# Patient Record
Sex: MALE | Race: WHITE | HISPANIC OR LATINO | Employment: UNEMPLOYED | URBAN - METROPOLITAN AREA
[De-identification: names, ages, dates, MRNs, and addresses within clinical notes are randomized per-mention and may not be internally consistent; named-entity substitution may affect disease eponyms.]

---

## 2018-08-07 PROBLEM — A41.9 SEVERE SEPSIS: Status: ACTIVE | Noted: 2018-08-07

## 2018-08-07 PROBLEM — N17.9 AKI (ACUTE KIDNEY INJURY): Status: ACTIVE | Noted: 2018-08-07

## 2018-08-07 PROBLEM — R79.89 ELEVATED LACTIC ACID LEVEL: Status: ACTIVE | Noted: 2018-08-07

## 2018-08-07 PROBLEM — R65.20 SEVERE SEPSIS: Status: ACTIVE | Noted: 2018-08-07

## 2018-08-07 PROBLEM — R50.9 FEBRILE ILLNESS: Status: ACTIVE | Noted: 2018-08-07

## 2018-08-07 PROBLEM — E11.9 TYPE 2 DIABETES MELLITUS WITHOUT COMPLICATION, WITHOUT LONG-TERM CURRENT USE OF INSULIN: Status: ACTIVE | Noted: 2018-08-07

## 2018-08-07 PROBLEM — D72.9 CSF PLEOCYTOSIS: Status: ACTIVE | Noted: 2018-08-07

## 2018-08-08 PROBLEM — M21.371 RIGHT FOOT DROP: Status: ACTIVE | Noted: 2018-08-08

## 2018-08-09 PROBLEM — G83.9 PARALYSIS: Status: ACTIVE | Noted: 2018-08-09

## 2018-08-09 PROBLEM — A92.31: Status: ACTIVE | Noted: 2018-08-09

## 2018-08-09 PROBLEM — E11.65 TYPE 2 DIABETES MELLITUS WITH HYPERGLYCEMIA, WITHOUT LONG-TERM CURRENT USE OF INSULIN: Status: ACTIVE | Noted: 2018-08-07

## 2018-08-10 PROBLEM — R79.89 ELEVATED LACTIC ACID LEVEL: Status: RESOLVED | Noted: 2018-08-07 | Resolved: 2018-08-10

## 2018-08-10 PROBLEM — R50.9 FEBRILE ILLNESS: Status: RESOLVED | Noted: 2018-08-07 | Resolved: 2018-08-10

## 2018-08-11 PROBLEM — B00.2 HERPES STOMATITIS: Status: ACTIVE | Noted: 2018-08-11

## 2018-08-12 PROBLEM — N17.9 AKI (ACUTE KIDNEY INJURY): Status: RESOLVED | Noted: 2018-08-07 | Resolved: 2018-08-12

## 2018-08-12 PROBLEM — R07.9 CHEST PAIN: Status: ACTIVE | Noted: 2018-08-12

## 2018-08-13 PROBLEM — N31.2 NEUROGENIC BLADDER, FLACCID: Status: ACTIVE | Noted: 2018-08-13

## 2018-08-14 PROBLEM — R29.810 FACIAL WEAKNESS: Status: ACTIVE | Noted: 2018-08-14

## 2018-08-14 PROBLEM — A41.9 SEVERE SEPSIS: Status: RESOLVED | Noted: 2018-08-07 | Resolved: 2018-08-14

## 2018-08-14 PROBLEM — R65.20 SEVERE SEPSIS: Status: RESOLVED | Noted: 2018-08-07 | Resolved: 2018-08-14

## 2018-08-15 PROBLEM — F32.A DEPRESSION: Status: ACTIVE | Noted: 2018-08-15

## 2018-08-15 PROBLEM — M79.2 NEUROPATHIC PAIN: Status: ACTIVE | Noted: 2018-08-15

## 2018-08-15 PROBLEM — I10 HYPERTENSION: Status: ACTIVE | Noted: 2018-08-15

## 2018-08-15 PROBLEM — G47.00 INSOMNIA: Status: ACTIVE | Noted: 2018-08-15

## 2018-08-19 PROBLEM — E87.6 HYPOKALEMIA: Status: ACTIVE | Noted: 2018-08-19

## 2018-08-21 PROBLEM — A41.9 SEVERE SEPSIS: Status: ACTIVE | Noted: 2018-08-21

## 2018-08-21 PROBLEM — R65.20 SEVERE SEPSIS: Status: ACTIVE | Noted: 2018-08-21

## 2018-08-28 PROBLEM — R11.2 NAUSEA AND VOMITING: Status: ACTIVE | Noted: 2018-08-28

## 2018-08-28 PROBLEM — N39.0 UTI (URINARY TRACT INFECTION): Status: ACTIVE | Noted: 2018-08-28

## 2018-09-09 PROBLEM — N30.00 ACUTE CYSTITIS: Status: ACTIVE | Noted: 2018-09-09

## 2018-09-19 PROBLEM — R33.9 URINE RETENTION: Status: ACTIVE | Noted: 2018-09-19

## 2018-09-30 PROBLEM — R30.0 DYSURIA: Status: ACTIVE | Noted: 2018-09-30

## 2018-09-30 PROBLEM — A41.9 SEPSIS: Status: ACTIVE | Noted: 2018-09-30

## 2018-09-30 PROBLEM — R00.0 TACHYCARDIA: Status: ACTIVE | Noted: 2018-09-30

## 2018-09-30 PROBLEM — R50.9 FEVER, UNKNOWN ORIGIN: Status: ACTIVE | Noted: 2018-09-30

## 2018-09-30 PROBLEM — I95.9 HYPOTENSION: Status: ACTIVE | Noted: 2018-09-30

## 2018-10-01 PROBLEM — A41.9 SEPSIS: Status: RESOLVED | Noted: 2018-09-30 | Resolved: 2018-10-01

## 2018-10-01 PROBLEM — R00.0 TACHYCARDIA: Status: RESOLVED | Noted: 2018-09-30 | Resolved: 2018-10-01

## 2018-10-01 PROBLEM — R30.0 DYSURIA: Status: RESOLVED | Noted: 2018-09-30 | Resolved: 2018-10-01

## 2018-10-01 PROBLEM — I95.9 HYPOTENSION: Status: RESOLVED | Noted: 2018-09-30 | Resolved: 2018-10-01

## 2018-10-01 PROBLEM — N41.0 PROSTATITIS, ACUTE: Status: ACTIVE | Noted: 2018-10-01

## 2018-10-01 PROBLEM — N30.00 ACUTE CYSTITIS WITHOUT HEMATURIA: Status: ACTIVE | Noted: 2018-09-30

## 2020-04-02 ENCOUNTER — NURSE TRIAGE (OUTPATIENT)
Dept: ADMINISTRATIVE | Facility: CLINIC | Age: 50
End: 2020-04-02

## 2020-04-03 NOTE — TELEPHONE ENCOUNTER
"Wife calling, with patient present. Patient reports chest discomfort that is intermittent and becoming worse in frequency and severity. He also is + cough, myalgia, sob, bleeding (stool) and HA. Per protocol he was advised to go be evaluated at the nearest ED. His wife reports she will bring him and verbalizes understanding.     Reason for Disposition   [1] Intermittent  chest pain or "angina" AND [2] increasing in severity or frequency  (Exception: pains lasting a few seconds)    Additional Information   Negative: Severe difficulty breathing (e.g., struggling for each breath, speaks in single words)   Negative: Difficult to awaken or acting confused (e.g., disoriented, slurred speech)   Negative: Shock suspected (e.g., cold/pale/clammy skin, too weak to stand, low BP, rapid pulse)   Negative: [1] Chest pain lasts > 5 minutes AND [2] history of heart disease  (i.e., heart attack, bypass surgery, angina, angioplasty, CHF; not just a heart murmur)   Negative: [1] Chest pain lasts > 5 minutes AND [2] described as crushing, pressure-like, or heavy   Negative: [1] Chest pain lasts > 5 minutes AND [2] age > 50   Negative: [1] Chest pain lasts > 5 minutes AND [2] age > 30 AND [3] at least one cardiac risk factor (i.e., hypertension, diabetes, obesity, smoker or strong family history of heart disease)   Negative: [1] Chest pain lasts > 5 minutes AND [2] not relieved with nitroglycerin   Negative: Passed out (i.e., lost consciousness, collapsed and was not responding)   Negative: Heart beating < 50 beats per minute OR > 140 beats per minute   Negative: Visible sweat on face or sweat dripping down face   Negative: Sounds like a life-threatening emergency to the triager   Negative: SEVERE chest pain    Protocols used: CHEST PAIN-A-AH      "

## 2020-12-09 PROBLEM — R94.39 ABNORMAL STRESS TEST: Status: ACTIVE | Noted: 2020-12-09

## 2021-02-09 ENCOUNTER — TELEPHONE (OUTPATIENT)
Dept: NEUROLOGY | Facility: CLINIC | Age: 51
End: 2021-02-09

## 2021-04-20 ENCOUNTER — OFFICE VISIT (OUTPATIENT)
Dept: NEUROLOGY | Facility: CLINIC | Age: 51
End: 2021-04-20
Payer: MEDICAID

## 2021-04-20 VITALS
BODY MASS INDEX: 35.7 KG/M2 | HEART RATE: 84 BPM | HEIGHT: 66 IN | DIASTOLIC BLOOD PRESSURE: 85 MMHG | WEIGHT: 222.13 LBS | SYSTOLIC BLOOD PRESSURE: 128 MMHG

## 2021-04-20 DIAGNOSIS — M21.372 BILATERAL FOOT-DROP: ICD-10-CM

## 2021-04-20 DIAGNOSIS — E11.65 TYPE 2 DIABETES MELLITUS WITH HYPERGLYCEMIA, WITHOUT LONG-TERM CURRENT USE OF INSULIN: ICD-10-CM

## 2021-04-20 DIAGNOSIS — M21.371 BILATERAL FOOT-DROP: ICD-10-CM

## 2021-04-20 DIAGNOSIS — G63 POLYNEUROPATHY ASSOCIATED WITH UNDERLYING DISEASE: ICD-10-CM

## 2021-04-20 DIAGNOSIS — B20 HIV INFECTION, UNSPECIFIED SYMPTOM STATUS: ICD-10-CM

## 2021-04-20 DIAGNOSIS — R41.89 COGNITIVE AND BEHAVIORAL CHANGES: Primary | ICD-10-CM

## 2021-04-20 DIAGNOSIS — R46.89 COGNITIVE AND BEHAVIORAL CHANGES: Primary | ICD-10-CM

## 2021-04-20 PROCEDURE — 99417 PR PROLONGED SVC, OUTPT, W/WO DIRECT PT CONTACT,  EA ADDTL 15 MIN: ICD-10-PCS | Mod: S$PBB,,, | Performed by: PSYCHIATRY & NEUROLOGY

## 2021-04-20 PROCEDURE — 99999 PR PBB SHADOW E&M-EST. PATIENT-LVL IV: ICD-10-PCS | Mod: PBBFAC,,, | Performed by: PSYCHIATRY & NEUROLOGY

## 2021-04-20 PROCEDURE — 99215 OFFICE O/P EST HI 40 MIN: CPT | Mod: S$PBB,,, | Performed by: PSYCHIATRY & NEUROLOGY

## 2021-04-20 PROCEDURE — 99999 PR PBB SHADOW E&M-EST. PATIENT-LVL IV: CPT | Mod: PBBFAC,,, | Performed by: PSYCHIATRY & NEUROLOGY

## 2021-04-20 PROCEDURE — 99214 OFFICE O/P EST MOD 30 MIN: CPT | Mod: PBBFAC,PO | Performed by: PSYCHIATRY & NEUROLOGY

## 2021-04-20 PROCEDURE — 99215 PR OFFICE/OUTPT VISIT, EST, LEVL V, 40-54 MIN: ICD-10-PCS | Mod: S$PBB,,, | Performed by: PSYCHIATRY & NEUROLOGY

## 2021-04-20 PROCEDURE — 99417 PROLNG OP E/M EACH 15 MIN: CPT | Mod: S$PBB,,, | Performed by: PSYCHIATRY & NEUROLOGY

## 2021-04-20 RX ORDER — LOSARTAN POTASSIUM AND HYDROCHLOROTHIAZIDE 12.5; 5 MG/1; MG/1
TABLET ORAL
COMMUNITY

## 2021-04-20 RX ORDER — GABAPENTIN 400 MG/1
400 CAPSULE ORAL
COMMUNITY
Start: 2021-01-26 | End: 2022-10-11

## 2021-04-20 RX ORDER — METFORMIN HYDROCHLORIDE 1000 MG/1
1000 TABLET ORAL 2 TIMES DAILY
COMMUNITY

## 2021-04-20 RX ORDER — DAPAGLIFLOZIN 10 MG/1
TABLET, FILM COATED ORAL
COMMUNITY
Start: 2021-02-04

## 2021-04-20 RX ORDER — INSULIN GLARGINE 100 [IU]/ML
10 INJECTION, SOLUTION SUBCUTANEOUS NIGHTLY
COMMUNITY

## 2021-07-08 ENCOUNTER — TELEPHONE (OUTPATIENT)
Dept: NEUROLOGY | Facility: CLINIC | Age: 51
End: 2021-07-08

## 2021-08-24 ENCOUNTER — TELEPHONE (OUTPATIENT)
Dept: NEUROLOGY | Facility: CLINIC | Age: 51
End: 2021-08-24

## 2022-10-11 ENCOUNTER — TELEPHONE (OUTPATIENT)
Dept: NEUROLOGY | Facility: CLINIC | Age: 52
End: 2022-10-11
Payer: MEDICAID

## 2022-10-11 ENCOUNTER — OFFICE VISIT (OUTPATIENT)
Dept: NEUROLOGY | Facility: CLINIC | Age: 52
End: 2022-10-11
Payer: MEDICAID

## 2022-10-11 ENCOUNTER — LAB VISIT (OUTPATIENT)
Dept: LAB | Facility: HOSPITAL | Age: 52
End: 2022-10-11
Attending: PSYCHIATRY & NEUROLOGY
Payer: MEDICAID

## 2022-10-11 VITALS
DIASTOLIC BLOOD PRESSURE: 74 MMHG | WEIGHT: 238.13 LBS | SYSTOLIC BLOOD PRESSURE: 137 MMHG | HEART RATE: 71 BPM | RESPIRATION RATE: 17 BRPM | BODY MASS INDEX: 38.43 KG/M2

## 2022-10-11 DIAGNOSIS — R29.6 FREQUENT FALLS: ICD-10-CM

## 2022-10-11 DIAGNOSIS — B20 HIV INFECTION, UNSPECIFIED SYMPTOM STATUS: ICD-10-CM

## 2022-10-11 DIAGNOSIS — E11.65 TYPE 2 DIABETES MELLITUS WITH HYPERGLYCEMIA, WITHOUT LONG-TERM CURRENT USE OF INSULIN: ICD-10-CM

## 2022-10-11 DIAGNOSIS — G63 POLYNEUROPATHY ASSOCIATED WITH UNDERLYING DISEASE: ICD-10-CM

## 2022-10-11 DIAGNOSIS — M21.371 RIGHT FOOT DROP: ICD-10-CM

## 2022-10-11 DIAGNOSIS — G63 POLYNEUROPATHY ASSOCIATED WITH UNDERLYING DISEASE: Primary | ICD-10-CM

## 2022-10-11 LAB
ALBUMIN SERPL BCP-MCNC: 4 G/DL (ref 3.5–5.2)
ALP SERPL-CCNC: 237 U/L (ref 55–135)
ALT SERPL W/O P-5'-P-CCNC: 46 U/L (ref 10–44)
ANION GAP SERPL CALC-SCNC: 10 MMOL/L (ref 8–16)
AST SERPL-CCNC: 28 U/L (ref 10–40)
BASOPHILS # BLD AUTO: 0.11 K/UL (ref 0–0.2)
BASOPHILS NFR BLD: 1 % (ref 0–1.9)
BILIRUB SERPL-MCNC: 0.8 MG/DL (ref 0.1–1)
BUN SERPL-MCNC: 16 MG/DL (ref 6–20)
CALCIUM SERPL-MCNC: 9.3 MG/DL (ref 8.7–10.5)
CHLORIDE SERPL-SCNC: 109 MMOL/L (ref 95–110)
CO2 SERPL-SCNC: 23 MMOL/L (ref 23–29)
CREAT SERPL-MCNC: 1.3 MG/DL (ref 0.5–1.4)
DIFFERENTIAL METHOD: ABNORMAL
EOSINOPHIL # BLD AUTO: 0.5 K/UL (ref 0–0.5)
EOSINOPHIL NFR BLD: 4.6 % (ref 0–8)
ERYTHROCYTE [DISTWIDTH] IN BLOOD BY AUTOMATED COUNT: 14.8 % (ref 11.5–14.5)
EST. GFR  (NO RACE VARIABLE): >60 ML/MIN/1.73 M^2
FOLATE SERPL-MCNC: 17.1 NG/ML (ref 4–24)
GLUCOSE SERPL-MCNC: 184 MG/DL (ref 70–110)
HCT VFR BLD AUTO: 46.4 % (ref 40–54)
HGB BLD-MCNC: 14.7 G/DL (ref 14–18)
IMM GRANULOCYTES # BLD AUTO: 0.05 K/UL (ref 0–0.04)
IMM GRANULOCYTES NFR BLD AUTO: 0.5 % (ref 0–0.5)
LYMPHOCYTES # BLD AUTO: 4.2 K/UL (ref 1–4.8)
LYMPHOCYTES NFR BLD: 39.7 % (ref 18–48)
MCH RBC QN AUTO: 28.9 PG (ref 27–31)
MCHC RBC AUTO-ENTMCNC: 31.7 G/DL (ref 32–36)
MCV RBC AUTO: 91 FL (ref 82–98)
MONOCYTES # BLD AUTO: 0.7 K/UL (ref 0.3–1)
MONOCYTES NFR BLD: 6.3 % (ref 4–15)
NEUTROPHILS # BLD AUTO: 5.1 K/UL (ref 1.8–7.7)
NEUTROPHILS NFR BLD: 47.9 % (ref 38–73)
NRBC BLD-RTO: 0 /100 WBC
PLATELET # BLD AUTO: 206 K/UL (ref 150–450)
PMV BLD AUTO: 12 FL (ref 9.2–12.9)
POTASSIUM SERPL-SCNC: 4.4 MMOL/L (ref 3.5–5.1)
PROT SERPL-MCNC: 7.6 G/DL (ref 6–8.4)
RBC # BLD AUTO: 5.09 M/UL (ref 4.6–6.2)
SODIUM SERPL-SCNC: 142 MMOL/L (ref 136–145)
VIT B12 SERPL-MCNC: 633 PG/ML (ref 210–950)
WBC # BLD AUTO: 10.63 K/UL (ref 3.9–12.7)

## 2022-10-11 PROCEDURE — 86334 IMMUNOFIX E-PHORESIS SERUM: CPT | Mod: 26,,, | Performed by: PATHOLOGY

## 2022-10-11 PROCEDURE — 80053 COMPREHEN METABOLIC PANEL: CPT | Performed by: PSYCHIATRY & NEUROLOGY

## 2022-10-11 PROCEDURE — 82746 ASSAY OF FOLIC ACID SERUM: CPT | Performed by: PSYCHIATRY & NEUROLOGY

## 2022-10-11 PROCEDURE — 84207 ASSAY OF VITAMIN B-6: CPT | Performed by: PSYCHIATRY & NEUROLOGY

## 2022-10-11 PROCEDURE — 1159F PR MEDICATION LIST DOCUMENTED IN MEDICAL RECORD: ICD-10-PCS | Mod: CPTII,,, | Performed by: PSYCHIATRY & NEUROLOGY

## 2022-10-11 PROCEDURE — 3008F BODY MASS INDEX DOCD: CPT | Mod: CPTII,,, | Performed by: PSYCHIATRY & NEUROLOGY

## 2022-10-11 PROCEDURE — 99215 PR OFFICE/OUTPT VISIT, EST, LEVL V, 40-54 MIN: ICD-10-PCS | Mod: S$PBB,,, | Performed by: PSYCHIATRY & NEUROLOGY

## 2022-10-11 PROCEDURE — 1160F RVW MEDS BY RX/DR IN RCRD: CPT | Mod: CPTII,,, | Performed by: PSYCHIATRY & NEUROLOGY

## 2022-10-11 PROCEDURE — 84425 ASSAY OF VITAMIN B-1: CPT | Performed by: PSYCHIATRY & NEUROLOGY

## 2022-10-11 PROCEDURE — 3078F DIAST BP <80 MM HG: CPT | Mod: CPTII,,, | Performed by: PSYCHIATRY & NEUROLOGY

## 2022-10-11 PROCEDURE — 36415 COLL VENOUS BLD VENIPUNCTURE: CPT | Mod: PO | Performed by: PSYCHIATRY & NEUROLOGY

## 2022-10-11 PROCEDURE — 85025 COMPLETE CBC W/AUTO DIFF WBC: CPT | Performed by: PSYCHIATRY & NEUROLOGY

## 2022-10-11 PROCEDURE — 99215 OFFICE O/P EST HI 40 MIN: CPT | Mod: S$PBB,,, | Performed by: PSYCHIATRY & NEUROLOGY

## 2022-10-11 PROCEDURE — 84165 PROTEIN E-PHORESIS SERUM: CPT | Mod: 26,,, | Performed by: PATHOLOGY

## 2022-10-11 PROCEDURE — 84165 PROTEIN E-PHORESIS SERUM: CPT | Performed by: PSYCHIATRY & NEUROLOGY

## 2022-10-11 PROCEDURE — 99999 PR PBB SHADOW E&M-EST. PATIENT-LVL V: ICD-10-PCS | Mod: PBBFAC,,, | Performed by: PSYCHIATRY & NEUROLOGY

## 2022-10-11 PROCEDURE — 3008F PR BODY MASS INDEX (BMI) DOCUMENTED: ICD-10-PCS | Mod: CPTII,,, | Performed by: PSYCHIATRY & NEUROLOGY

## 2022-10-11 PROCEDURE — 3075F SYST BP GE 130 - 139MM HG: CPT | Mod: CPTII,,, | Performed by: PSYCHIATRY & NEUROLOGY

## 2022-10-11 PROCEDURE — 3075F PR MOST RECENT SYSTOLIC BLOOD PRESS GE 130-139MM HG: ICD-10-PCS | Mod: CPTII,,, | Performed by: PSYCHIATRY & NEUROLOGY

## 2022-10-11 PROCEDURE — 3078F PR MOST RECENT DIASTOLIC BLOOD PRESSURE < 80 MM HG: ICD-10-PCS | Mod: CPTII,,, | Performed by: PSYCHIATRY & NEUROLOGY

## 2022-10-11 PROCEDURE — 86334 IMMUNOFIX E-PHORESIS SERUM: CPT | Performed by: PSYCHIATRY & NEUROLOGY

## 2022-10-11 PROCEDURE — 1160F PR REVIEW ALL MEDS BY PRESCRIBER/CLIN PHARMACIST DOCUMENTED: ICD-10-PCS | Mod: CPTII,,, | Performed by: PSYCHIATRY & NEUROLOGY

## 2022-10-11 PROCEDURE — 4010F PR ACE/ARB THEARPY RXD/TAKEN: ICD-10-PCS | Mod: CPTII,,, | Performed by: PSYCHIATRY & NEUROLOGY

## 2022-10-11 PROCEDURE — 99999 PR PBB SHADOW E&M-EST. PATIENT-LVL V: CPT | Mod: PBBFAC,,, | Performed by: PSYCHIATRY & NEUROLOGY

## 2022-10-11 PROCEDURE — 86255 FLUORESCENT ANTIBODY SCREEN: CPT | Mod: 59 | Performed by: PSYCHIATRY & NEUROLOGY

## 2022-10-11 PROCEDURE — 82607 VITAMIN B-12: CPT | Performed by: PSYCHIATRY & NEUROLOGY

## 2022-10-11 PROCEDURE — 99215 OFFICE O/P EST HI 40 MIN: CPT | Mod: PBBFAC,PO | Performed by: PSYCHIATRY & NEUROLOGY

## 2022-10-11 PROCEDURE — 86334 PATHOLOGIST INTERPRETATION IFE: ICD-10-PCS | Mod: 26,,, | Performed by: PATHOLOGY

## 2022-10-11 PROCEDURE — 4010F ACE/ARB THERAPY RXD/TAKEN: CPT | Mod: CPTII,,, | Performed by: PSYCHIATRY & NEUROLOGY

## 2022-10-11 PROCEDURE — 1159F MED LIST DOCD IN RCRD: CPT | Mod: CPTII,,, | Performed by: PSYCHIATRY & NEUROLOGY

## 2022-10-11 PROCEDURE — 84165 PATHOLOGIST INTERPRETATION SPE: ICD-10-PCS | Mod: 26,,, | Performed by: PATHOLOGY

## 2022-10-11 RX ORDER — INSULIN ASPART 100 [IU]/ML
INJECTION, SOLUTION INTRAVENOUS; SUBCUTANEOUS
COMMUNITY
Start: 2022-09-05

## 2022-10-11 RX ORDER — CARIPRAZINE 1.5 MG/1
1.5 CAPSULE, GELATIN COATED ORAL DAILY
COMMUNITY
Start: 2022-10-05

## 2022-10-11 RX ORDER — PEN NEEDLE, DIABETIC 32GX 5/32"
NEEDLE, DISPOSABLE MISCELLANEOUS
COMMUNITY
Start: 2022-10-08

## 2022-10-11 RX ORDER — VALSARTAN 320 MG/1
320 TABLET ORAL DAILY
COMMUNITY
Start: 2022-06-27

## 2022-10-11 RX ORDER — AMLODIPINE BESYLATE 10 MG/1
10 TABLET ORAL DAILY
COMMUNITY
Start: 2022-09-24

## 2022-10-11 RX ORDER — HYDROXYZINE HYDROCHLORIDE 50 MG/1
50 TABLET, FILM COATED ORAL 3 TIMES DAILY
COMMUNITY
Start: 2022-10-02

## 2022-10-11 RX ORDER — CLOTRIMAZOLE AND BETAMETHASONE DIPROPIONATE 10; .64 MG/G; MG/G
CREAM TOPICAL DAILY
COMMUNITY
Start: 2022-08-25

## 2022-10-11 RX ORDER — DOXYCYCLINE HYCLATE 100 MG
100 TABLET ORAL DAILY
COMMUNITY
Start: 2022-08-25

## 2022-10-11 RX ORDER — DAPAGLIFLOZIN AND METFORMIN HYDROCHLORIDE 5; 1000 MG/1; MG/1
2 TABLET, FILM COATED, EXTENDED RELEASE ORAL EVERY MORNING
COMMUNITY
Start: 2022-09-20

## 2022-10-11 RX ORDER — DULAGLUTIDE 4.5 MG/.5ML
INJECTION, SOLUTION SUBCUTANEOUS
COMMUNITY
Start: 2022-09-15

## 2022-10-11 RX ORDER — DORAVIRINE, LAMIVUDINE, AND TENOFOVIR DISOPROXIL FUMARATE 100; 300; 300 MG/1; MG/1; MG/1
1 TABLET, FILM COATED ORAL DAILY
COMMUNITY
Start: 2022-08-15

## 2022-10-11 RX ORDER — GABAPENTIN 600 MG/1
600 TABLET ORAL 3 TIMES DAILY
COMMUNITY
Start: 2022-07-04

## 2022-10-11 RX ORDER — ROSUVASTATIN CALCIUM 40 MG/1
40 TABLET, COATED ORAL DAILY
COMMUNITY
Start: 2022-10-02

## 2022-10-11 RX ORDER — METOPROLOL TARTRATE 50 MG/1
50 TABLET ORAL 2 TIMES DAILY
COMMUNITY
Start: 2022-09-05

## 2022-10-11 NOTE — PROGRESS NOTES
"NEUROLOGY  Outpatient Follow Up  [facility]  [address], [place] [address]  [phone] (office) / [phone] (fax)    Patient Name:  Satya Jackson  :  1970  MR #:  [id number]  Acct #:  [id number]    Date of Neurology Visit: 10/11/2022  Name of Neurologist: Zenia Bender D.O, ABPN, AOBNP, ABSIRI    Other Physicians:  [facility] (Primary Care Physician); No ref. provider found (Referring)      Chief Complaint: Extremity Weakness      History of Present Illness (HPI):  Satya Jackson is a 52 y.o. male returns for follow up of neuropathy.    Unfortunately, he was lost to follow up for a period of time.  Outside records were not received from ID.    Currently his diabetes is improving.      He has been following at [facility].  He was seen by a neuromuscular specialist there.  They said they went a couple of times and there was really no benefit in those visits.      They states he is getting worse.  His walking is worse.  He is falling a lot.  His wife feels his steps appear uncoordinated and he is dragging his foot.     He has not been to  and has not had a foot brace for the foot drop.  He was borrowing a right AFO while going, but was lost to follow up due to illness.  This was at [place].    His memory has been much better lately.  He used to have trouble with loss of words, but that has been better.      His urinary issues have been better.  He is not having accidents.  He is no longer wearing depends.    His bowel issues have also improved.      He had surgery on his eye.  It is doing better.  He had diabetic retinopathy treated with laser.    He is on new medication for HIV.  This was to avoid medication interactions.  His HIV counts are good - "non-detectable".    His A1c is better.  He is managing his diet.  He is on insulin for management (Lantus and Trulicity).  He is seeing Dr. Tolentino at [facility].  He has felt better since the " "blood sugar has been under better control.    When standing, his wife states he will start to fall forward.  He has no issues with dizziness or hearing loss.      Interval Hx:  Initial HPI 4/20/21  Satya Jackson is a 50 y.o. male referred by [facility] for consultation regarding multifocal motor neuropathy, guillain barre syndrome, memory changes and sleep apnea.  Further investigation into the referral seems this was specifically to assess foot drop and neuropathy.  Patient is here with his wife.  His first language is not english, though he does speak it.    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  Since then he started to decline further.    He states that he had "white matter disease".  They thought maybe he had MS.  He had an extensive workup at an outside clinic.  Due to being unable to identify what was going on he was referred to neuromuscular clinic for another opinion.  Symptoms began in 2018 with back pain.  This then progressed to difficulty walking.  He was admitted to the hospital because of rigors and fevers.  He was septic at one point. During the hospitalization he became worse.  Neurology was consulted.  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  After leaving the hospital he seemed to start to decline.  About 1.5 years later he was doing pretty well walking, but he felt that he was struggling more and more to walk.  He states the strength started to decline.    He was diagnosed with HIV in 2020.  His wife thought maybe he got HIV from PLEX.  They don't know if this has been investigated.  He follows at [facility], Dr. Demi Barrera.  He is on treatment for this.  His wife states he has no reflexes and he can't feel his feet.  He has a little bit of tremors in his hand and arms.  He can also get cramps or spasms in his hands.  It is worse when he tries to  things firmly.  His wife has concerns that his memory is also being " affected.  His wife states that he was driving and forgot where he was going.  She states that he will be spaced out at times.  She states that sometimes he will sit with his mouth open.  He is slow to respond to things.  He is repeating himself or asking questions more than he used to.  His wife states this is a change from what he was before.     He thinks his DM is getting better.  He was seen in the hospital earlier this year for congestive heart failure.  His blood sugar was in the 300s after this admission.  Usually he is in the 200s.  He is now seeing an endocrinologist.  His blood work from the last 2 years has been at [facility] so is not fully available at this time.  He states he cannot make it to the bathroom. He will start dripping before he can get to the bathroom.  He has to wear Depends when he goes out.  He has urinated on himself before.  This has not been assessed by his PCP or a urologist.  He did have a digital prostate exam that was reported to be normal.  He was on flomax but is not currently taking it.  He is also having sexual dysfunction.  He has erectile dysfunction since being in the hospital in 2018.    He started having incontinence of bowel last year. This was diarrhea.  There was some intestinal discomfort. It would often happen while sleeping.  Right now he is doing better with this.    He has difficulty swallowing.  This is rare.  He feels like his throat is dry.  When eating he will cough or choke.  He is not on any reflux medications.  He was sent to gastro-enterology because something was seen in his liver.  He had a colonoscopy.  There is concern he could develop liver cirrhosis.  They have not done an endoscopy on him.  The colonoscopy was otherwise normal.  He is on Gabapentin for pain.  He has burning in his feet, mainly in the ball of the foot.  He has no trouble speaking.   He is having some vision issue, but saw the eye doctor and he has bleeding on retina.  They state the  "eye doctor mentioned diabetic retinopathy. Dr. Grimes.  He has been falling.  He has not had any injuries other than some scrapes.  He has a right AFO.  He is using a cane.   He cannot feel cuts and scrapes on his legs.    He is following with podiatry.  He is in PT right now.  He is working on balance, endurance, and strengthening.  This is at [place].  He was not given OT.  He has had hammer toes since being in the hospital.  He has to manually straighten his toes to put on shoes.  He wears a right AFO.    Moved to the  in  from [place].      Treatment to date:    PLEX in hospital in 2018  Gabapentin for neuropathic pain    Review of Systems:    See HPI    Past Medical, Surgical, Family & Social History:   Reviewed and updated.    Home Medications:     Current Outpatient Medications:     BD ÁLVARO 2ND GEN PEN NEEDLE 32 gauge x " Ndle, , Disp: , Rfl:     carvediloL (COREG) 25 MG tablet, Take 25 mg by mouth 2 (two) times daily with meals., Disp: , Rfl:     clotrimazole-betamethasone 1-0.05% (LOTRISONE) cream, Apply topically once daily., Disp: , Rfl:     dapagliflozin (FARXIGA) 10 mg tablet, 1 tablet, Disp: , Rfl:     DELSTRIGO 100-300-300 mg Tab, Take 1 tablet by mouth once daily., Disp: , Rfl:     doxycycline (VIBRA-TABS) 100 MG tablet, Take 100 mg by mouth once daily., Disp: , Rfl:     DULoxetine (CYMBALTA) 60 MG capsule, Take 60 mg by mouth once daily., Disp: , Rfl:     gabapentin (NEURONTIN) 600 MG tablet, Take 600 mg by mouth 3 (three) times daily., Disp: , Rfl:     hydrOXYzine (ATARAX) 50 MG tablet, Take 50 mg by mouth 3 (three) times daily., Disp: , Rfl:     insulin (LANTUS SOLOSTAR U-100 INSULIN) glargine 100 units/mL (3mL) SubQ pen, Inject 10 Units into the skin every evening., Disp: , Rfl:     metoprolol tartrate (LOPRESSOR) 50 MG tablet, Take 50 mg by mouth 2 (two) times daily., Disp: , Rfl:     NOVOLOG FLEXPEN U-100 INSULIN 100 unit/mL (3 mL) InPn pen, SMARTSI SUB-Q 3 Times Daily, " Disp: , Rfl:     rosuvastatin (CRESTOR) 40 MG Tab, Take 40 mg by mouth once daily., Disp: , Rfl:     traZODone (DESYREL) 100 MG tablet, TAKE 1 TABLET BY MOUTH ONCE DAILY AT BEDTIME AS NEEDED, Disp: , Rfl:     TRULICITY 4.5 mg/0.5 mL pen injector, Inject into the skin., Disp: , Rfl:     valsartan (DIOVAN) 320 MG tablet, Take 320 mg by mouth once daily., Disp: , Rfl:     VRAYLAR 1.5 mg Cap, Take 1.5 mg by mouth once daily., Disp: , Rfl:     XIGDUO XR 5-1,000 mg, Take 2 tablets by mouth every morning., Disp: , Rfl:     amLODIPine (NORVASC) 10 MG tablet, Take 10 mg by mouth once daily., Disp: , Rfl:     aspirin (ECOTRIN) 81 MG EC tablet, Take 1 tablet (81 mg total) by mouth once daily., Disp: 90 tablet, Rfl: 3    darunavir-santos-emtri-tenof ala (SYMTUZA) 615-766-578-10 mg Tab, Take 1 tablet by mouth once daily, Disp: , Rfl:     glipizide-metformin (METAGLIP) 2.5-500 mg per tablet, TAKE 1 TABLET BY MOUTH TWICE DAILY WITH A MEAL FOR 30 DAYS, Disp: , Rfl:     losartan (COZAAR) 100 MG tablet, Take 100 mg by mouth once daily., Disp: , Rfl:     losartan-hydrochlorothiazide 50-12.5 mg (HYZAAR) 50-12.5 mg per tablet, 1 tablet, Disp: , Rfl:     metFORMIN (GLUCOPHAGE) 1000 MG tablet, Take 1,000 mg by mouth 2 (two) times a day., Disp: , Rfl:     pravastatin (PRAVACHOL) 40 MG tablet, Take 1 tablet (40 mg total) by mouth once daily., Disp: 90 tablet, Rfl: 0    tamsulosin (FLOMAX) 0.4 mg Cap, Take 2 capsules (0.8 mg total) by mouth every evening., Disp: 60 capsule, Rfl: 6    Physical Examination:  /73 (BP Location: Right leg, Patient Position: Sitting, BP Method: Large (Automatic))   Pulse 77   Resp 17   Wt 108 kg (238 lb 1.6 oz)   BMI 38.43 kg/m²     Vitals:    10/11/22 0904 10/11/22 1105 10/11/22 1109 10/11/22 1110   BP: 130/73 (!) 142/67 (!) 152/72 137/74   BP Location: Right leg Right arm Right arm Right arm   Patient Position: Sitting Lying Sitting Standing   BP Method: Large (Automatic) Medium (Automatic) Medium  (Automatic) Medium (Automatic)   Pulse: 77 63 68 71   Resp: 17      Weight: 108 kg (238 lb 1.6 oz)            GENERAL:  General appearance: Well, non-toxic appearing.  No apparent distress.  Extremities: hammer toes, high arches.    MENTAL STATUS:  Alertness, attention span & concentration: normal.  Language: normal.  Orientation to self, place & time:  States the year and day of week.  Month stated as March.  Memory, recent & remote: fair.  Fund of knowledge: normal.    SPEECH:  Clear and fluent.  Follows complex commands.    CRANIAL NERVES:  Cranial Nerves II-XII were examined.  II - Visual fields: normal.  III, IV, VI: PERRL, EOMI, No ptosis, No nystagmus.  V - Facial sensation: normal.  VII - Face symmetry & mobility: normal.  VIII - Hearing: normal.  IX, X - Palate: mobile & midline.  XI - Shoulder shrug: normal.  XII - Tongue protrusion: normal.    GROSS MOTOR:  Gait & station: uses cane, some difficulty clearing right foot.  Tone: normal.  Abnormal movements: none.  Finger-nose & Heel-knee-shin: normal.  Rapid alternating movements & drift: normal.    MUSCLE STRENGTH:     Fascics Atrophy RIGHT    LEFT Atrophy Fascics     5 Neck Ext. 5       5 Neck Flex 5       5 Deltoids 5       5 Sh.Ext.Rot. 5       5 Sh.Int.Rot. 5       5 Biceps 5       5 Triceps 5       5 Forearm.Pr. 5       5 Wrist Ext. 5       5 Wrist Flex 5       5 Finger Ext. 5       5 Finger Flex 5       5 FPL 5       5 Inteross. 5                         5 Iliopsoas 5       5 Hip Abduct 5       5 Hip Adduct 5       5 Quads 5       5 Hams 5       3 Dorsiflex 5       5 Plantar Flex 5       4 Ankle Zay 5       4 Ankle Invert 5       2 Toe Ext. 3       5 Toe Flex 5                         REFLEXES:    RIGHT Reflex   LEFT   tr Biceps tr   0 Brachiorad. 0   0 Triceps 0   - Pectoralis -   - Jaw Jerk -   - Viera's -        0 Patellar 0   0 Ankle 0   - Suprapatellar -             Down PLANTAR Down         SENSORY:  Sharp touch: Stocking loss to mid  "shin.  Vibration: slightly decreased in toes.      Diagnostic Data Reviewed:   Extensive review of previous records including hospital records, outside neurology records and request of testing from outside neurology (EMG)    [facility] from [facility]:  Neurology consult with Dr. Aquino 8/9/18:  HPI: This is a pleasant 47-year-old gentleman from [place]. He has a history of diabetes mellitus and recently had a left lower extremity abscess anterior to his tibia that was incised and drained 2 weeks ago.Patient presents to the ER today for fevers, chills, Reiger's, low back pain, bilateral lower extremity pain and tingling. Onset of fevers approximately 2 days ago. Patient states he has midline low back pain that has radiated to both flanks and has come around his abdomen. He also states he has some mild chest pain. He has had temperature up to 102. He denies any diarrhea, but has been vomiting. He states it feels "hot" when he urinates.  In the emergency department, patient had tachycardia, fever 101.3, lactic acid of 6.8, but otherwise an unremarkable workup. Urinalysis was normal, CT revealed abdomen without pelvis was unremarkable. Hospital medicine was consulted for further evaluation.     Poorly controlled diabetic, A1C = 12.5. Hypocalcemia, elevated lactate on admission. CSF analysis reveals elevated protein (252), elevated WBC 83, segmented neutrophils 86%.  Glucose 120, consistent with serum levels. Suggestive of viral or aseptic meningitis, ID following and sent more specific studies for enterovirus, WNV, leptospira.  Patient had recently had several skin lesions found to have MSSA. Just before admission had dizziness, headache, unsteady gait, paresthesias in feet ascending up legs then hands.  Weakness a couple days before admission.  Could not move ankles.  Developed bowel and bladder incontinence.  Some dyspnea.  Reflexes absent on exam.  Bilateral foot drop on exam.  CSF however is not consistent with GBS as " pleocytosis is present.  Possible this could be two conditions at once - GBS following skin infections, with concurrent viral encephalitis - or West Nile virus with myelopathic (anterior horn and dorsal root ganglion) involvement.  Given rapid progression of weakness over past 48 hours, would recommend aggressive treatment strategy with ID and PLEX.       Seen in follow up by Dr. Wilcox.    8/13/18: ambulated down the hallway. Feels strength and sensation improving. In speaking with the nurse, she attempted to clamp the turcios but the patient had no sensation to urinate and was retaining 400-500 cc urine. GQ1B, ACE, and lyme negative.         MRI Lumbar Spine W WO Cont   Order: [id number]   Status:  Final result   Visible to patient:  No (Not Released) Next appt:  None   Details            Reading Physician Reading Date Result Priority   Ney De Leon MD 8/7/2018     Narrative       EXAMINATION:  MRI LUMBAR SPINE W WO CONTRAST 08/06/2018 at 10:59 p.m.    CLINICAL HISTORY:  Low back pain, cancer or infection suspected;  clinical history is provided of fever, lower extremity pain bilaterally.  Diabetes.  No history of recent trauma or surgery is provided    TECHNIQUE:  Multiplanar, multisequence images of the lumbar spine are provided with 19 cc Omniscan contrast.    COMPARISON:  CT abdomen and pelvis which includes the lumbar spine same day report of 1453.  No previous MRI is currently available    FINDINGS:  No gross soft tissue defect or significant susceptibility artifact is appreciated.  Osseous alignment appears maintained with no diffuse acute abnormal marrow or conus signal appreciated with the conus ending at approximately T12-L1.  No extruded  type fragment or abnormal enhancement is appreciated.  No paraspinal fluid collections are appreciated.  No gross spondylolysis or spondylolisthesis is appreciated.  There is slight decreased disc space water signal L4-5 and L5-S1 predominantly.  There are renal  cystic changes present for example left laterally measuring 2.5 x 2.2 cm.    L1-2 demonstrates no significant interval spinal canal or neural foraminal narrowing.    L2-3 demonstrates no significant interval spinal canal or neural foraminal narrowing.    L3-4 demonstrates no significant interval spinal canal or neural foraminal narrowing.    L4-5 demonstrates some dorsal disc bulging and annular fissuring with mild lateral recess, inferior neural foraminal narrowing bilaterally.    L5-S1 demonstrates some broad-based dorsal disc bulging with small central protrusion and annular fissuring.  There is mild to moderate lateral recess, inferior neural foraminal narrowing left slightly more so than right.  There does appear to be a small early disc extrusion appearance left paracentral, lateral recess measuring approximately 4 x 5 mm image 10 sagittal T2 and corresponding image 40 T2 axial.       Impression         1. No mass effect, fluid collection or abnormal enhancement is appreciated.  2. There are some mild chronic appearing degenerative disc changes L4-5 and L5-S1 predominantly.  This contributes to Modic endplate degeneration as well as mild to moderate lateral recess, inferior neural foraminal narrowing bilaterally left slightly more so than right.  There does appear to be a small early disc extrusion type appearance at the left lateral recess level with some slight abutment of the exiting nerve root.  The findings are overall concordant with [place] with extrusion side discordance at this level.      Electronically signed by: Ney De Leon MD  Date: 08/07/2018  Time: 06:53            CT Head Without Contrast   Order: [id number]   Status:  Final result   Visible to patient:  No (Not Released) Next appt:  None   Details            Reading Physician Reading Date Result Priority   Keith Brandon MD 8/9/2018     Narrative       EXAMINATION:  CT HEAD WITHOUT CONTRAST    CLINICAL HISTORY:  Confusion, delirium, altered  level of consciousness.    TECHNIQUE:  Axial CT images were obtained of the brain without intravenous contrast.  Coronal and sagittal reformations were obtained.  Automated exposure control utilized to reduce radiation dose.  Total exam DLP is 506 mGy cm.    COMPARISON:  None    FINDINGS:  Gray-white matter differentiation is within normal limits.   No acute intracranial hemorrhage, extra-axial fluid collection, hydrocephalus, mass effect, midline shift is noted.  No large vessel territory acute ischemia is identified.  Visualized paranasal sinuses are clear.  Visualized mastoid air cells are clear.  No acute displaced calvarial fracture is identified.       Impression         1. No acute intracranial abnormalities identified.      Electronically signed by: Keith Brandon MD  Date: 08/09/2018  Time: 12:30             Last Resulted: 08/09/18 12:30              Records from [facility]:  Patient seen for decline in strength, gait, and memory over the past year.  EEG, MRI brain, memory labs, neuropathy labs, 4 limb EMG were ordered.  +HIV labs noted.    MRI brain with microvascular disease.  EEG reported as normal.  EMG reported as motor-sensory demyelinating and axonal peripheral neuropathy.  Neuropathy labs unremarkable.  Cognitive labs unremarkable. He was referred to speech therapy for cognitive.  He was noted to have chronic, asymptomatic GBS.  He was diagnosed with multifocal motor sensory demyelinating neuropathy and given gabaentin.  HIV management er ID.  He was sent for hypercoag labs and stroke workup.  He was sent for MRI C and T spine to be sure no evidence of demyelination.  MRA brain unremarkable.  Echo with 52% EF.  TCD negative.  CUS no stenosis, hypercoag labs neg. He was put on a trial of Lyrica.  Due to swelling in the lower limbs, duplex were ordered - negative. He was also set up for a home sleep study.    [facility] BUE 9/9/20:  This was reported as axonal and demyelinating neuropathy.  Per my  review I do not see any evidence to support demyelination.Conduction velocities are robust.  No latencies were greater than 130% of normal.  [facility] BLE 9/10/20:  This was reported as a peripheral neuropathy.  In my review, there is active denervation present in the left lower extremity more than the right. These changes at L5/S1 could be related to the findings on his MRI lumbar spine.    Component      Latest Ref Rng & Units 8/8/2022 2/9/2022 8/7/2018   Hemoglobin A1C External      4.7 - 5.6 % 7.7 (H) 10.9 (H) 12.5 (H)   Estimated Avg Glucose      <115 mg/dL 174 (H) 266 (H) 312 (H)       Assessment and Plan:  Satya Jackson is a 50 y.o., right handed man with a history of Guillain-[place] syndrome now presenting with cognitive decline, worsening bilateral footdrop, and incontinence of bowel and bladder.  Etiology is not clear, but given his history of very poorly controlled diabetes with diabetic eye disease, this most consistent with diabetic peripheral neuropathy.  The progression has not been rapid as seen with acute demyelinating neuropathy (GBS) and typically this is a once in a lifetime condition.  He could be showing signs of CIDP however there is no evidence of demyelination on his EMG.     The bilateral lower extremity EMG from [facility] suggests some of this could be from his back as well.  MRI of the lumbar spine does reveal L5-S1 disease particularly on the left where his increased active denervation is seen on EMG.    He was sent to speech therapy for cognitive issues and also had a sleep study ordered.      Peripheral neuropathy can also be seen in HIV, particularly at seroconversion.      Advise urology referral for erectile dysfunction and bladder leakage/incontinence.  He has no signs of autonomic dysfunction clinically otherwise.  Orthostatic vital signs today were negative.    Plan per AVS:  Will get blood work today to look at any other factors that could contribute to neuropathy  and balance issues.    Will get a new EMG of the right leg with some comparison in the left and of the arm.      Will get a new AFO (ankle brace) to support your foot and minimize tripping.    Will return to therapy to work on balance and strengthening.      Important to note, also  has a past medical history of Diabetes mellitus, Guillain Barré syndrome, Hypertension, PONV (postoperative nausea and vomiting), Stroke, West Nile encephalitis, and White matter disease.    Time Spent: I spent a total of 57 minutes on the day of the visit.This includes face to face time and non-face to face time preparing to see the patient (eg, review of tests), Obtaining and/or reviewing separately obtained history, Documenting clinical information in the electronic or other health record, Independently interpreting resultsand communicating results to the patient/family/caregiver, or Care coordination.      Zenia Bender D.O, ABPN, AOBNP, ABEM     This note was created with voice recognition software.  Grammatical, syntax and spelling errors may be inevitable.

## 2022-10-11 NOTE — PATIENT INSTRUCTIONS
Will get blood work today to look at any other factors that could contribute to neuropathy and balance issues.    Will get a new EMG of the right leg with some comparison in the left and of the arm.      Will get a new AFO (ankle brace) to support your foot and minimize tripping.    Will return to therapy to work on balance and strengthening.

## 2022-10-12 LAB
ALBUMIN SERPL ELPH-MCNC: 3.86 G/DL (ref 3.35–5.55)
ALPHA1 GLOB SERPL ELPH-MCNC: 0.27 G/DL (ref 0.17–0.41)
ALPHA2 GLOB SERPL ELPH-MCNC: 1.08 G/DL (ref 0.43–0.99)
B-GLOBULIN SERPL ELPH-MCNC: 0.89 G/DL (ref 0.5–1.1)
GAMMA GLOB SERPL ELPH-MCNC: 1.09 G/DL (ref 0.67–1.58)
INTERPRETATION SERPL IFE-IMP: NORMAL
PATHOLOGIST INTERPRETATION IFE: NORMAL
PATHOLOGIST INTERPRETATION SPE: NORMAL
PROT SERPL-MCNC: 7.2 G/DL (ref 6–8.4)

## 2022-10-14 LAB
ANCA AB TITR SER IF: NORMAL TITER
P-ANCA TITR SER IF: NORMAL TITER

## 2022-10-17 LAB
PYRIDOXAL SERPL-MCNC: 13 UG/L (ref 5–50)
VIT B1 BLD-MCNC: 116 UG/L (ref 38–122)

## 2022-10-24 ENCOUNTER — TELEPHONE (OUTPATIENT)
Dept: NEUROLOGY | Facility: CLINIC | Age: 52
End: 2022-10-24
Payer: MEDICAID

## 2022-10-24 ENCOUNTER — CLINICAL SUPPORT (OUTPATIENT)
Dept: REHABILITATION | Facility: HOSPITAL | Age: 52
End: 2022-10-24
Attending: PSYCHIATRY & NEUROLOGY
Payer: MEDICAID

## 2022-10-24 DIAGNOSIS — R29.6 FREQUENT FALLS: ICD-10-CM

## 2022-10-24 DIAGNOSIS — G63 POLYNEUROPATHY ASSOCIATED WITH UNDERLYING DISEASE: ICD-10-CM

## 2022-10-24 DIAGNOSIS — M21.371 RIGHT FOOT DROP: ICD-10-CM

## 2022-10-24 DIAGNOSIS — M62.81 MUSCLE WEAKNESS: Primary | ICD-10-CM

## 2022-10-24 DIAGNOSIS — R26.81 UNSTEADY GAIT: ICD-10-CM

## 2022-10-24 DIAGNOSIS — M25.60 DECREASED RANGE OF MOTION: ICD-10-CM

## 2022-10-24 PROCEDURE — 97110 THERAPEUTIC EXERCISES: CPT | Mod: PN | Performed by: PHYSICAL THERAPIST

## 2022-10-24 PROCEDURE — 97162 PT EVAL MOD COMPLEX 30 MIN: CPT | Mod: PN | Performed by: PHYSICAL THERAPIST
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
## 2022-10-24 NOTE — TELEPHONE ENCOUNTER
Returned Hue's call regarding EMG appointment and lab results.   Informed her the lab result will be forwarded to Dr. Bender to review and Dr. Bender's EMG schedule is full for the rest of the year and the January schedule is not out at this time.  Will give her a call back to schedule.

## 2022-10-31 NOTE — PROGRESS NOTES
[facility]   Physical Therapy Treatment Note     Name: Satya Jackson  Clinic Number: [id number]    Therapy Diagnosis:   Encounter Diagnoses   Name Primary?    Muscle weakness Yes    Decreased range of motion     Unsteady gait      Physician: Zenia Bender,*    Visit Date: 11/1/2022    Physician Orders: Peripheral neuropathy with right foot drop.  Patient with falls and poor postural stability (will start to fall forward when standing).  Please eval and treat.  Medical Diagnosis from Referral:   G63 (ICD-10-CM) - Polyneuropathy associated with underlying disease   M21.371 (ICD-10-CM) - Right foot drop   R29.6 (ICD-10-CM) - Frequent falls      Evaluation Date: 10/24/2022  Authorization Period Expiration: 10-11-23  Plan of Care Expiration: 12-19-22  Progress Note Due: 11-24-22  Visit # / Visits authorized: 2/ 21  FOTO: Issued Visit #: 1     MD Follow up appointment: none scheduled     Precautions: hx of Guillian [place], R foot drop, hx of stroke and CHF, peripheral neuropathy, diabetes      PTA Visit #: 0/5     Time In: 1:47  Time Out: 2:30  Total Billable Time: 43 minutes    SUBJECTIVE     Pt reports: doing ok No falls.  Pt was compliant with home exercise program.  Response to previous treatment: felt ok  Functional change: no falls     Pt reports same foot pain, no change, understands focus is on balance and gait     OBJECTIVE     Objective Measures updated at progress report unless specified.     Treatment     Satya received the treatments listed below:       Satya received therapeutic exercises to develop strength, endurance, ROM, flexibility, and core stabilization for 15 minutes including:  Bridge x 15  SLRx 15  Hip abd SL x 15  GSS with strap x 10 long sitting on mat  Toe crunches x 3 min  Toe abd/add x 20   Marbles x 3:00 about 1/2 bucket alternated between feet     Instructed pt to increase reps with strengthening as giovanna.      neuromuscular re-education activities  to improve: Balance, Coordination, Kinesthetic, Sense, and Proprioception for 28 minutes. The following activities were included:  Bello Balance with ankle AFO R in shoes    Bello Balance score 38/56, see scores in media section    Performed in // bars  March forward/backward x 2 laps  Walk sideways x 2 laps  Balance in standing with eyes closed x 30 sec severe LOB at 20 sec, required assistance from PT   Balance in standing with feet together x 30 sec  Balance in standing with feet together with eyes closed x 30 sec    HOLD Below today  Balance on foam x 30 sec  Balance on foam with eyes closed x 30 sec  Balance on foam with feet together x 30 sec  Balance on foam with feet together with eyes closed x 30 sec  Balance on foam tandem x 30 sec B positions     Patient Education and Home Exercises     Home Exercises Provided and Patient Education Provided     Education provided:   - HEP   - exercise in pain free zone       Written Home Exercises Provided: Patient instructed to cont prior HEP. Exercises were reviewed and Satya was able to demonstrate them prior to the end of the session.  Satya demonstrated good  understanding of the education provided. See EMR under Patient Instructions for exercises provided during therapy sessions    ASSESSMENT     Pt giovanna treatment well, required min cueing for proper performance of therex and able to progress with repetitions.  Pt with mod risk for fall as per Bello Balance score.  Pt with significant LOB with eyes closed possibly due to neuropathy.  Pt will benefit from progression with balance activities to work toward decreased risk for fall.      Satya Is progressing well towards his goals.   Pt prognosis is Fair.     Pt will continue to benefit from skilled outpatient physical therapy to address the goals listed in the initial evaluation, provide pt/family education and to maximize pt's level of independence in the home and community environment.     Pt's spiritual, cultural and  educational needs considered and pt agreeable to plan of care and goals.     Anticipated barriers to physical therapy: slight language barrier possible and will provide  as needed    GOALS:   Short Term Goals:  4 weeks  Increase range of motion 25%  Increase strength 1/2 muscle grade  Be able to perform HEP with minimal cueing required  Improve Bello Balance to 41/56     Long Term Goals: 8 weeks  Increase range of motion to 75% to 100% full   Improve muscle strength 1 muscle grade  Improve Bello Balance to 45/56   Restore ability to ambulate with normal gait with AFO   Walking for ADL will be restored without episodes of falls  Restore ability to stand for ADL without episodes of falls  Restore ability to walk in yard without episodes of falls  Restore ability to perform ADL's and household activities independently and without episodes of falls    PLAN   Continue with established plan of care towards PT goals.      Progress with strength and balance work as tolerated     Lizbeth Castro, PT

## 2022-11-01 ENCOUNTER — CLINICAL SUPPORT (OUTPATIENT)
Dept: REHABILITATION | Facility: HOSPITAL | Age: 52
End: 2022-11-01
Payer: MEDICAID

## 2022-11-01 DIAGNOSIS — R26.81 UNSTEADY GAIT: ICD-10-CM

## 2022-11-01 DIAGNOSIS — M62.81 MUSCLE WEAKNESS: Primary | ICD-10-CM

## 2022-11-01 DIAGNOSIS — M25.60 DECREASED RANGE OF MOTION: ICD-10-CM

## 2022-11-01 PROCEDURE — 97110 THERAPEUTIC EXERCISES: CPT | Mod: PN | Performed by: PHYSICAL THERAPIST

## 2022-11-01 PROCEDURE — 97112 NEUROMUSCULAR REEDUCATION: CPT | Mod: PN | Performed by: PHYSICAL THERAPIST
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
## 2022-11-07 ENCOUNTER — CLINICAL SUPPORT (OUTPATIENT)
Dept: REHABILITATION | Facility: HOSPITAL | Age: 52
End: 2022-11-07
Attending: PSYCHIATRY & NEUROLOGY
Payer: MEDICAID

## 2022-11-07 DIAGNOSIS — R26.81 UNSTEADY GAIT: ICD-10-CM

## 2022-11-07 DIAGNOSIS — M25.60 DECREASED RANGE OF MOTION: ICD-10-CM

## 2022-11-07 DIAGNOSIS — M62.81 MUSCLE WEAKNESS: Primary | ICD-10-CM

## 2022-11-07 PROCEDURE — 97112 NEUROMUSCULAR REEDUCATION: CPT | Mod: PN | Performed by: PHYSICAL THERAPIST

## 2022-11-07 PROCEDURE — 97110 THERAPEUTIC EXERCISES: CPT | Mod: PN | Performed by: PHYSICAL THERAPIST

## 2022-11-17 ENCOUNTER — CLINICAL SUPPORT (OUTPATIENT)
Dept: REHABILITATION | Facility: HOSPITAL | Age: 52
End: 2022-11-17
Attending: PSYCHIATRY & NEUROLOGY
Payer: MEDICAID

## 2022-11-17 DIAGNOSIS — R26.81 UNSTEADY GAIT: ICD-10-CM

## 2022-11-17 DIAGNOSIS — M25.60 DECREASED RANGE OF MOTION: ICD-10-CM

## 2022-11-17 DIAGNOSIS — M62.81 MUSCLE WEAKNESS: Primary | ICD-10-CM

## 2022-11-17 PROCEDURE — 97110 THERAPEUTIC EXERCISES: CPT | Mod: PN,CQ

## 2022-11-17 NOTE — PROGRESS NOTES
[facility]   Physical Therapy Treatment Note     Name: Satya Jackson  Clinic Number: [id number]    Therapy Diagnosis:   Encounter Diagnoses   Name Primary?    Muscle weakness Yes    Decreased range of motion     Unsteady gait        Physician: Zenia Bender,*    Visit Date: 11/17/2022    Physician Orders: Peripheral neuropathy with right foot drop.  Patient with falls and poor postural stability (will start to fall forward when standing).  Please eval and treat.  Medical Diagnosis from Referral:   G63 (ICD-10-CM) - Polyneuropathy associated with underlying disease   M21.371 (ICD-10-CM) - Right foot drop   R29.6 (ICD-10-CM) - Frequent falls      Evaluation Date: 10/24/2022  Authorization Period Expiration: 10-11-23  Plan of Care Expiration: 12-19-22  Progress Note Due: 11-24-22  Visit # / Visits authorized: 4/ 21  FOTO: Issued Visit #: 1     MD Follow up appointment: none scheduled     Precautions: hx of Guillian [place], R foot drop, hx of stroke and CHF, peripheral neuropathy, diabetes      PTA Visit #: 1/5     Time In: 1334  Time Out: 1430  Total Billable Time: 56 minutes    SUBJECTIVE     Pt reports: fell when walking down decline of deck (wearing sandals and not AFO) and twisted his ankle. Fell on his back. Right ankle discomfort 3/10.     Pt was compliant with home exercise program.  Response to previous treatment: felt ok  Functional change: fell Saturday.     Pt reports same foot pain, no change, understands focus is on balance and gait     OBJECTIVE     Objective Measures updated at progress report unless specified.     Treatment     Satya received the treatments listed below:       Satya received therapeutic exercises to develop strength, endurance, ROM, flexibility, and core stabilization for 15 minutes including:  Performed in clinic as warm up Instructed pt to continue working on increasing reps as tolerated at home  Bridge x 15-slight cramp left calf when  "initiating but resolved within seconds  SLRx 10-cues for eccentric control  Hip abd SL x 10-manual A for maintaining stacked hips  March with abdominal bracing, supine x 10     Minisquats x 15  Toe touch on 6" step x 10 each leg  Forward step up 6"  On L 2 episodes of knee locking into extension without good control.  Provided support with PT behind knee to make sure he did not hyperextend knee.  Pt with good control on R but monitored closely with PT providing support at post knee to make sure and avoid possibility of hyperextension -NP  TKE with RTB x 10 standing-NP time  Sit to stand with foam in chair x 10 fatigue at end -NP time  Hip ext leaning on counter(4" step with pillow on mat in clinic)-NP time    Held below due to time constraints  GSS with strap x 10 long sitting on mat-seated in chair today  Toe crunches x 3 min  Toe abd/add x 20  Marbles x 3:00 about 1/2 bucket alternated between feet     Instructed pt to increase reps with strengthening as giovanna.      neuromuscular re-education activities to improve: Balance, Coordination, Kinesthetic, Sense, and Proprioception for 41 minutes. The following activities were included:    Performed in // bars  March forward/backward x 2 laps  Walk sideways x 2 laps  Balance in standing with eyes closed 2 x 30 sec  LOB x 3 no assistance from PT required  Balance in standing with feet together 2 x 30 sec  Balance in standing with feet together with eyes closed 2 x 30 sec  LOB required min assist from PT x 3  Balance on foam 2 x 30 sec with mod instability noted, but able to maintain balance  Balance on foam with eyes closed 2 x 30 sec LOB with PT assistance required   Balance on foam with feet together 2 x 30 sec  Modified rhomberg 2 x 30 sec each foot 1 LOB with R in front       HOLD below  Balance on foam with feet together with eyes closed x 30 sec  Balance on foam tandem x 30 sec B positions     Patient Education and Home Exercises     Home Exercises Provided and " Patient Education Provided     Education provided:   - HEP   - exercise in pain free zone       Written Home Exercises Provided: Patient instructed to cont prior HEP.     Exercises were reviewed and Satya was able to demonstrate them prior to the end of the session.  Satya demonstrated good  understanding of the education provided. See EMR under Patient Instructions for exercises provided during therapy sessions    ASSESSMENT     Mild lightheadedness with initial sitting after supine exercises which resolved within a few seconds.  Limited standing exercises today to prevent increase in right ankle discomfort. Pain remained 3/10 throughout session.  Extra time for each marble with pickups 2* difficulty grasping, cues for increased toe grounding before attempting to lift. Minimal A for LOB with balance challenges with eyes closed. Cueing to engage toes to help with stability in standing balance challenges. Pt will benefit from continued work on balance to improve stability     Satya Is progressing well towards his goals.   Pt prognosis is Fair.     Pt will continue to benefit from skilled outpatient physical therapy to address the goals listed in the initial evaluation, provide pt/family education and to maximize pt's level of independence in the home and community environment.     Pt's spiritual, cultural and educational needs considered and pt agreeable to plan of care and goals.     Anticipated barriers to physical therapy: slight language barrier possible and will provide  as needed    GOALS:   Short Term Goals:  4 weeks   ongoing  Increase range of motion 25%  Increase strength 1/2 muscle grade  Be able to perform HEP with minimal cueing required  Improve Bello Balance to 41/56     Long Term Goals: 8 weeks   ongoing  Increase range of motion to 75% to 100% full   Improve muscle strength 1 muscle grade  Improve Bello Balance to 45/56   Restore ability to ambulate with normal gait with AFO   Walking for  ADL will be restored without episodes of falls  Restore ability to stand for ADL without episodes of falls  Restore ability to walk in yard without episodes of falls  Restore ability to perform ADL's and household activities independently and without episodes of falls    PLAN   Continue with established plan of care towards PT goals.      Progress with strength and balance work as tolerated     Selin Oneil, SHERRY

## 2022-11-22 ENCOUNTER — TELEPHONE (OUTPATIENT)
Dept: NEUROLOGY | Facility: CLINIC | Age: 52
End: 2022-11-22
Payer: MEDICAID

## 2022-11-25 NOTE — PROGRESS NOTES
[facility]   Physical Therapy Progress and Treatment Note     Name: Satya Jackson  Clinic Number: [id number]    Therapy Diagnosis:   Encounter Diagnoses   Name Primary?    Muscle weakness Yes    Decreased range of motion     Unsteady gait        Physician: Zenia Bender,*    Visit Date: 11/28/2022    Physician Orders: Peripheral neuropathy with right foot drop.  Patient with falls and poor postural stability (will start to fall forward when standing).  Please eval and treat.  Medical Diagnosis from Referral:   G63 (ICD-10-CM) - Polyneuropathy associated with underlying disease   M21.371 (ICD-10-CM) - Right foot drop   R29.6 (ICD-10-CM) - Frequent falls      Evaluation Date: 10/24/2022  Authorization Period Expiration: 10-11-23  Plan of Care Expiration: 12-19-22  Progress Note Due: 12-19-22  Visit # / Visits authorized: 5/ 21  FOTO: Issued Visit #: 1     MD Follow up appointment: none scheduled     Precautions: hx of Guillian [place], R foot drop, hx of stroke and CHF, peripheral neuropathy, diabetes      PTA Visit #: 1/5     Time In: 12:45  Time Out: 1:30  Total Billable Time: 45 minutes    SUBJECTIVE     Pt reports: overall feeling stronger and more stable with walking     Pt was compliant with home exercise program.  Response to previous treatment: felt ok  Functional change: walking improved and wearing AFO    Pt reports same foot pain, no change, understands focus is on balance and gait     OBJECTIVE   Functional assessment:   - walking/gait: slight FB of trunk but normal gait at IE slight FB of trunk with wide based steppage gait   - sit to stand: min use of hands at IE mod use of hands  - sit to supine: no difficulty       - supine to sit: no difficulty  - supine to prone: no difficulty at IE min difficulty     Ankle ROM: (measured in degrees) 11-28-22  Ankle RLE LLE   Dorsiflexion with knees straight 0/0 0/0   Dorsiflexion with knees bent -5/-5 5/5   Plantarflexion  45/45 45/45   Inversion 30/30 30/30   Eversion 20/20 20/20   Great toe flexion 30/30 30/35   Great toe extension 0/35 35/45             Ankle ROM: (measured in degrees) initial eval  Ankle  Active /Passive RLE LLE   Dorsiflexion with knees straight -5/-5 0/0   Dorsiflexion with knees bent -5/-5 5/5   Plantarflexion 45/45 45/45   Inversion 30/30 30/30   Eversion 15/15 20/20   Great toe flexion 30/30 30/35   Great toe extension  0/35 35/45      Knee  ROM: WNL     Flexibility testing:  - hamstrings:     90/90 test R 20 L 15 at IE R 20 L 20         Muscle Strength 11-28-22  MMT R L   Hip flexion 4+/5 4+/5   Hip abduction 4+/5 4+/5   Hip extension 4+/5 4+/5   Glut max 3+/5 3/5   Toe flexors     Toe extensor EHL  0/5 3-/5   Knee extension 5/5 5/5   Knee flexion 5/5 5/5   Ankle dorsiflexion 3/5 5/5   Ankle plantar flexion 4/5 4/5   Ankle inversion + PF  4-/5 5/5   Ankle eversion 4/5 5/5                 Muscle Strength initial eval   MMT R L   Hip flexion 4-/5 4-/5   Hip abduction 4/5 4-/5   Hip extension 4+/5 4+/5   Glut max 3-/5 3-/5   Toe flexors 3+/5 3/5   Toe extensors EHL 0/5 3-/5   Knee extension 5/5 5/5   Knee flexion 4/5 4+/5   Ankle dorsiflexion 3/5 5/5   Ankle plantar flexion 4-/5 4/5   Ankle inversion + PF  3+/5 3+/5   Ankle eversion 4/5 4+/5      Endurance is fair     Special tests: Bello Balance score 41/56 at IE 38/56     Palpation: no c/o TTP in foot and ankle      Joint mobility: not tested      Sensation: pt able to feel light touch and pressure and toe positioning for proprioception         CMS Impairment/Limitation/Restriction for FOTO balance Survey     Therapist reviewed FOTO scores for Satya Jackson   FOTO documents entered into [employer] - see Media section.     Limitation Score: 57 % at IE 56%          Treatment     Satya received the treatments listed below:       Satya received therapeutic exercises to develop strength, endurance, ROM, flexibility, and core stabilization for 30 minutes  "including:  Performed in clinic as warm up Instructed pt to continue working on increasing reps as tolerated at home     Soleus stretch with strap x 10   Sit to stand x 10 pillow in chair    Verbal review of ex below  Above provided to add to HEP  Bridge x 15-slight cramp left calf when initiating but resolved within seconds  SLRx 10-cues for eccentric control  Hip abd SL x 10-manual A for maintaining stacked hips  March with abdominal bracing, supine x 10     Minisquats x 15  Toe touch on 6" step x 10 each leg  Forward step up 6"  On L 2 episodes of knee locking into extension without good control.  Provided support with PT behind knee to make sure he did not hyperextend knee.  Pt with good control on R but monitored closely with PT providing support at post knee to make sure and avoid possibility of hyperextension -NP  TKE with RTB x 10 standing-NP time  Sit to stand with foam in chair x 10 fatigue at end -NP time  Hip ext leaning on counter(4" step with pillow on mat in clinic)-NP time      GSS with strap x 10 long sitting on mat-seated in chair today  Toe crunches x 3 min  Toe abd/add x 20  Marbles x 3:00 about 1/2 bucket alternated between feet     Instructed pt to increase reps with strengthening as giovanna.      neuromuscular re-education activities to improve: Balance, Coordination, Kinesthetic, Sense, and Proprioception for 15 minutes. The following activities were included:    Bello Balance test  Held below due to time constraints    Performed in // bars  March forward/backward x 2 laps  Walk sideways x 2 laps  Balance in standing with eyes closed 2 x 30 sec  LOB x 3 no assistance from PT required  Balance in standing with feet together 2 x 30 sec  Balance in standing with feet together with eyes closed 2 x 30 sec  LOB required min assist from PT x 3  Balance on foam 2 x 30 sec with mod instability noted, but able to maintain balance  Balance on foam with eyes closed 2 x 30 sec LOB with PT assistance required "   Balance on foam with feet together 2 x 30 sec  Modified rhomberg 2 x 30 sec each foot 1 LOB with R in front       HOLD below  Balance on foam with feet together with eyes closed x 30 sec  Balance on foam tandem x 30 sec B positions     Patient Education and Home Exercises     Home Exercises Provided and Patient Education Provided     Education provided:   - HEP   - exercise in pain free zone       Written Home Exercises Provided: yes    Exercises were reviewed and Satya was able to demonstrate them prior to the end of the session.  Satya demonstrated good  understanding of the education provided. See EMR under Patient Instructions for exercises provided during therapy sessions    ASSESSMENT   Pt with no ankle c/o. Patient demonstrates improvement in range of motion, strength, stabilization and function.  Pt with similar FOTO, but slightly improved Bello balance.  Pt continues with lack of EHL and appears to understand need to always use AFO to avoid tripping resulting in fall.          Satya Is progressing well towards his goals.   Pt prognosis is Fair.     Pt will continue to benefit from skilled outpatient physical therapy to address the goals listed in the initial evaluation, provide pt/family education and to maximize pt's level of independence in the home and community environment.     Pt's spiritual, cultural and educational needs considered and pt agreeable to plan of care and goals.     Anticipated barriers to physical therapy: slight language barrier possible and will provide  as needed    GOALS:   Short Term Goals:  4 weeks   MET STG's  Increase range of motion 25%  Increase strength 1/2 muscle grade  Be able to perform HEP with minimal cueing required  Improve Bello Balance to 41/56     Long Term Goals: 8 weeks   ongoing  Increase range of motion to 75% to 100% full   Improve muscle strength 1 muscle grade  Improve Bello Balance to 45/56   Restore ability to ambulate with normal gait with AFO    Walking for ADL will be restored without episodes of falls  Restore ability to stand for ADL without episodes of falls  Restore ability to walk in yard without episodes of falls  Restore ability to perform ADL's and household activities independently and without episodes of falls    PLAN   Continue with established plan of care towards PT goals.      Progress with strength and balance work as tolerated     Lizbeth Castro, PT

## 2022-11-28 ENCOUNTER — CLINICAL SUPPORT (OUTPATIENT)
Dept: REHABILITATION | Facility: HOSPITAL | Age: 52
End: 2022-11-28
Attending: PSYCHIATRY & NEUROLOGY
Payer: MEDICAID

## 2022-11-28 DIAGNOSIS — R26.81 UNSTEADY GAIT: ICD-10-CM

## 2022-11-28 DIAGNOSIS — M25.60 DECREASED RANGE OF MOTION: ICD-10-CM

## 2022-11-28 DIAGNOSIS — M62.81 MUSCLE WEAKNESS: Primary | ICD-10-CM

## 2022-11-28 PROCEDURE — 97110 THERAPEUTIC EXERCISES: CPT | Mod: PN | Performed by: PHYSICAL THERAPIST

## 2022-11-28 PROCEDURE — 97112 NEUROMUSCULAR REEDUCATION: CPT | Mod: PN | Performed by: PHYSICAL THERAPIST

## 2022-11-28 NOTE — PLAN OF CARE
[facility]   Physical Therapy Progress and Treatment Note     Name: Satya Jackson  Clinic Number: [id number]    Therapy Diagnosis:   Encounter Diagnoses   Name Primary?    Muscle weakness Yes    Decreased range of motion     Unsteady gait        Physician: Zenia Bender,*    Visit Date: 11/28/2022    Physician Orders: Peripheral neuropathy with right foot drop.  Patient with falls and poor postural stability (will start to fall forward when standing).  Please eval and treat.  Medical Diagnosis from Referral:   G63 (ICD-10-CM) - Polyneuropathy associated with underlying disease   M21.371 (ICD-10-CM) - Right foot drop   R29.6 (ICD-10-CM) - Frequent falls      Evaluation Date: 10/24/2022  Authorization Period Expiration: 10-11-23  Plan of Care Expiration: 12-19-22  Progress Note Due: 12-19-22  Visit # / Visits authorized: 5/ 21  FOTO: Issued Visit #: 1     MD Follow up appointment: none scheduled     Precautions: hx of Guillian [place], R foot drop, hx of stroke and CHF, peripheral neuropathy, diabetes      PTA Visit #: 1/5     Time In: 12:45  Time Out: 1:30  Total Billable Time: 45 minutes    SUBJECTIVE     Pt reports: overall feeling stronger and more stable with walking     Pt was compliant with home exercise program.  Response to previous treatment: felt ok  Functional change: walking improved and wearing AFO    Pt reports same foot pain, no change, understands focus is on balance and gait     OBJECTIVE   Functional assessment:   - walking/gait: slight FB of trunk but normal gait at IE slight FB of trunk with wide based steppage gait   - sit to stand: min use of hands at IE mod use of hands  - sit to supine: no difficulty       - supine to sit: no difficulty  - supine to prone: no difficulty at IE min difficulty     Ankle ROM: (measured in degrees) 11-28-22  Ankle RLE LLE   Dorsiflexion with knees straight 0/0 0/0   Dorsiflexion with knees bent -5/-5 5/5   Plantarflexion  45/45 45/45   Inversion 30/30 30/30   Eversion 20/20 20/20   Great toe flexion 30/30 30/35   Great toe extension 0/35 35/45             Ankle ROM: (measured in degrees) initial eval  Ankle  Active /Passive RLE LLE   Dorsiflexion with knees straight -5/-5 0/0   Dorsiflexion with knees bent -5/-5 5/5   Plantarflexion 45/45 45/45   Inversion 30/30 30/30   Eversion 15/15 20/20   Great toe flexion 30/30 30/35   Great toe extension  0/35 35/45      Knee  ROM: WNL     Flexibility testing:  - hamstrings:     90/90 test R 20 L 15 at IE R 20 L 20         Muscle Strength 11-28-22  MMT R L   Hip flexion 4+/5 4+/5   Hip abduction 4+/5 4+/5   Hip extension 4+/5 4+/5   Glut max 3+/5 3/5   Toe flexors     Toe extensor EHL  0/5 3-/5   Knee extension 5/5 5/5   Knee flexion 5/5 5/5   Ankle dorsiflexion 3/5 5/5   Ankle plantar flexion 4/5 4/5   Ankle inversion + PF  4-/5 5/5   Ankle eversion 4/5 5/5                 Muscle Strength initial eval   MMT R L   Hip flexion 4-/5 4-/5   Hip abduction 4/5 4-/5   Hip extension 4+/5 4+/5   Glut max 3-/5 3-/5   Toe flexors 3+/5 3/5   Toe extensors EHL 0/5 3-/5   Knee extension 5/5 5/5   Knee flexion 4/5 4+/5   Ankle dorsiflexion 3/5 5/5   Ankle plantar flexion 4-/5 4/5   Ankle inversion + PF  3+/5 3+/5   Ankle eversion 4/5 4+/5      Endurance is fair     Special tests: Bello Balance score 41/56 at IE 38/56     Palpation: no c/o TTP in foot and ankle      Joint mobility: not tested      Sensation: pt able to feel light touch and pressure and toe positioning for proprioception         CMS Impairment/Limitation/Restriction for FOTO balance Survey     Therapist reviewed FOTO scores for Satya Jackson   FOTO documents entered into [email or web address] - see Media section.     Limitation Score: 57 % at IE 56%          Treatment     Satya received the treatments listed below:       Satya received therapeutic exercises to develop strength, endurance, ROM, flexibility, and core stabilization for 30 minutes  "including:  Performed in clinic as warm up Instructed pt to continue working on increasing reps as tolerated at home     Soleus stretch with strap x 10   Sit to stand x 10 pillow in chair    Verbal review of ex below  Above provided to add to HEP  Bridge x 15-slight cramp left calf when initiating but resolved within seconds  SLRx 10-cues for eccentric control  Hip abd SL x 10-manual A for maintaining stacked hips  March with abdominal bracing, supine x 10     Minisquats x 15  Toe touch on 6" step x 10 each leg  Forward step up 6"  On L 2 episodes of knee locking into extension without good control.  Provided support with PT behind knee to make sure he did not hyperextend knee.  Pt with good control on R but monitored closely with PT providing support at post knee to make sure and avoid possibility of hyperextension -NP  TKE with RTB x 10 standing-NP time  Sit to stand with foam in chair x 10 fatigue at end -NP time  Hip ext leaning on counter(4" step with pillow on mat in clinic)-NP time      GSS with strap x 10 long sitting on mat-seated in chair today  Toe crunches x 3 min  Toe abd/add x 20  Marbles x 3:00 about 1/2 bucket alternated between feet     Instructed pt to increase reps with strengthening as giovanna.      neuromuscular re-education activities to improve: Balance, Coordination, Kinesthetic, Sense, and Proprioception for 15 minutes. The following activities were included:    Bello Balance test  Held below due to time constraints    Performed in // bars  March forward/backward x 2 laps  Walk sideways x 2 laps  Balance in standing with eyes closed 2 x 30 sec  LOB x 3 no assistance from PT required  Balance in standing with feet together 2 x 30 sec  Balance in standing with feet together with eyes closed 2 x 30 sec  LOB required min assist from PT x 3  Balance on foam 2 x 30 sec with mod instability noted, but able to maintain balance  Balance on foam with eyes closed 2 x 30 sec LOB with PT assistance required "   Balance on foam with feet together 2 x 30 sec  Modified rhomberg 2 x 30 sec each foot 1 LOB with R in front       HOLD below  Balance on foam with feet together with eyes closed x 30 sec  Balance on foam tandem x 30 sec B positions     Patient Education and Home Exercises     Home Exercises Provided and Patient Education Provided     Education provided:   - HEP   - exercise in pain free zone       Written Home Exercises Provided: yes    Exercises were reviewed and Satya was able to demonstrate them prior to the end of the session.  Satya demonstrated good  understanding of the education provided. See EMR under Patient Instructions for exercises provided during therapy sessions    ASSESSMENT   Pt with no ankle c/o. Patient demonstrates improvement in range of motion, strength, stabilization and function.  Pt with similar FOTO, but slightly improved Bello balance.  Pt continues with lack of EHL and appears to understand need to always use AFO to avoid tripping resulting in fall.          Satya Is progressing well towards his goals.   Pt prognosis is Fair.     Pt will continue to benefit from skilled outpatient physical therapy to address the goals listed in the initial evaluation, provide pt/family education and to maximize pt's level of independence in the home and community environment.     Pt's spiritual, cultural and educational needs considered and pt agreeable to plan of care and goals.     Anticipated barriers to physical therapy: slight language barrier possible and will provide  as needed    GOALS:   Short Term Goals:  4 weeks   MET STG's  Increase range of motion 25%  Increase strength 1/2 muscle grade  Be able to perform HEP with minimal cueing required  Improve Bello Balance to 41/56     Long Term Goals: 8 weeks   ongoing  Increase range of motion to 75% to 100% full   Improve muscle strength 1 muscle grade  Improve Bello Balance to 45/56   Restore ability to ambulate with normal gait with AFO    Walking for ADL will be restored without episodes of falls  Restore ability to stand for ADL without episodes of falls  Restore ability to walk in yard without episodes of falls  Restore ability to perform ADL's and household activities independently and without episodes of falls    PLAN   Continue with established plan of care towards PT goals.      Progress with strength and balance work as tolerated     Lizbeth Castro, PT

## 2022-12-07 ENCOUNTER — CLINICAL SUPPORT (OUTPATIENT)
Dept: REHABILITATION | Facility: HOSPITAL | Age: 52
End: 2022-12-07
Payer: MEDICAID

## 2022-12-07 DIAGNOSIS — R26.81 UNSTEADY GAIT: ICD-10-CM

## 2022-12-07 DIAGNOSIS — M25.60 DECREASED RANGE OF MOTION: ICD-10-CM

## 2022-12-07 DIAGNOSIS — M62.81 MUSCLE WEAKNESS: Primary | ICD-10-CM

## 2022-12-07 PROCEDURE — 97110 THERAPEUTIC EXERCISES: CPT | Mod: PN,CQ

## 2022-12-07 NOTE — PROGRESS NOTES
[facility]   Physical Therapy Progress and Treatment Note     Name: Satya Jackson  Clinic Number: [id number]    Therapy Diagnosis:   Encounter Diagnoses   Name Primary?    Muscle weakness Yes    Decreased range of motion     Unsteady gait          Physician: Zenia Bender,*    Visit Date: 12/7/2022    Physician Orders: Peripheral neuropathy with right foot drop.  Patient with falls and poor postural stability (will start to fall forward when standing).  Please eval and treat.  Medical Diagnosis from Referral:   G63 (ICD-10-CM) - Polyneuropathy associated with underlying disease   M21.371 (ICD-10-CM) - Right foot drop   R29.6 (ICD-10-CM) - Frequent falls      Evaluation Date: 10/24/2022  Authorization Period Expiration: 112/19/2022  Plan of Care Expiration: 12-19-22  Progress Note Due: 12-19-22  Visit # / Visits authorized: 6/ 21  FOTO: Issued Visit #: 1     MD Follow up appointment: none scheduled     Precautions: hx of Guillian [place], R foot drop, hx of stroke and CHF, peripheral neuropathy, diabetes      PTA Visit #: 1/5     Time In: 1405  Time Out: 1500  Total Billable Time: 55 minutes    SUBJECTIVE     Pt reports: no pain today. Denies soreness after last session. Wearing right AFO.     Pt was compliant with home exercise program.  Response to previous treatment: felt ok  Functional change: walking improved and wearing AFO    Pt reports (none) same foot pain, no change, understands focus is on balance and gait     OBJECTIVE   Functional assessment:   - walking/gait: slight FB of trunk but normal gait at IE slight FB of trunk with wide based steppage gait   - sit to stand: min use of hands at IE mod use of hands  - sit to supine: no difficulty       - supine to sit: no difficulty  - supine to prone: no difficulty at IE min difficulty     Ankle ROM: (measured in degrees) 11-28-22  Ankle RLE LLE   Dorsiflexion with knees straight 0/0 0/0   Dorsiflexion with knees bent  -5/-5 5/5   Plantarflexion 45/45 45/45   Inversion 30/30 30/30   Eversion 20/20 20/20   Great toe flexion 30/30 30/35   Great toe extension 0/35 35/45             Ankle ROM: (measured in degrees) initial eval  Ankle  Active /Passive RLE LLE   Dorsiflexion with knees straight -5/-5 0/0   Dorsiflexion with knees bent -5/-5 5/5   Plantarflexion 45/45 45/45   Inversion 30/30 30/30   Eversion 15/15 20/20   Great toe flexion 30/30 30/35   Great toe extension  0/35 35/45      Knee  ROM: WNL     Flexibility testing:  - hamstrings:     90/90 test R 20 L 15 at IE R 20 L 20         Muscle Strength 11-28-22  MMT R L   Hip flexion 4+/5 4+/5   Hip abduction 4+/5 4+/5   Hip extension 4+/5 4+/5   Glut max 3+/5 3/5   Toe flexors     Toe extensor EHL  0/5 3-/5   Knee extension 5/5 5/5   Knee flexion 5/5 5/5   Ankle dorsiflexion 3/5 5/5   Ankle plantar flexion 4/5 4/5   Ankle inversion + PF  4-/5 5/5   Ankle eversion 4/5 5/5                 Muscle Strength initial eval   MMT R L   Hip flexion 4-/5 4-/5   Hip abduction 4/5 4-/5   Hip extension 4+/5 4+/5   Glut max 3-/5 3-/5   Toe flexors 3+/5 3/5   Toe extensors EHL 0/5 3-/5   Knee extension 5/5 5/5   Knee flexion 4/5 4+/5   Ankle dorsiflexion 3/5 5/5   Ankle plantar flexion 4-/5 4/5   Ankle inversion + PF  3+/5 3+/5   Ankle eversion 4/5 4+/5      Endurance is fair     Special tests: Bello Balance score 41/56 at IE 38/56     Palpation: no c/o TTP in foot and ankle      Joint mobility: not tested      Sensation: pt able to feel light touch and pressure and toe positioning for proprioception         CMS Impairment/Limitation/Restriction for FOTO balance Survey     Therapist reviewed FOTO scores for Satya Jackson   FOTO documents entered into [email or web address] - see Media section.     Limitation Score: 57 % at IE 56%          Treatment     Satya received the treatments listed below:       Satya received therapeutic exercises to develop strength, endurance, ROM, flexibility, and core  "stabilization for 30 minutes including:  Performed in clinic as warm up Instructed pt to continue working on increasing reps as tolerated at home    Soleus stretch with strap x 10  Sit to stand x 10 pillow in chair    Bridge x 20-no cramp today  SLRx 15 cues for eccentric control  Hip abd SL x 10-manual A for maintaining stacked hips  March with abdominal bracing, supine x 15     Minisquats x 15  Toe touch on 6" step x 10 each leg  Forward step up 6"  x 10 On L 2 episodes of knee locking into extension without good control.  Provided support with PT behind knee to make sure he did not hyperextend knee.  Pt with good control on R but monitored closely with PT providing support at post knee to make sure and avoid possibility of hyperextension   TKE with RTB x 10 standing-NP time  Sit to stand with foam in chair x 10 fatigue at end   Hip ext leaning on counter(4" step with pillow on mat in clinic)-NP time      GSS with strap x 10 long sitting on mat-seated in chair today  Toe crunches x 3 min  Toe abd/add x 20  Marbles x 3:00 about 1/2 bucket alternated between feet     Instructed pt to increase reps with strengthening as giovanna.      neuromuscular re-education activities to improve: Balance, Coordination, Kinesthetic, Sense, and Proprioception for 25 minutes. The following activities were included:    Performed in // bars:  March forward/backward x 2 laps  Walk sideways x 2 laps  Balance in standing with eyes closed 2 x 30 sec  LOB x 3 no assistance from PT required  Balance in standing with feet together 2 x 30 sec  Balance in standing with feet together with eyes closed 2 x 30 sec  LOB required min assist from PT x 3  Balance on foam 2 x 30 sec with mod instability noted, but able to maintain balance  Balance on foam with eyes closed 2 x 30 sec LOB with PT assistance required   Balance on foam with feet together 2 x 30 sec  Modified rhomberg 2 x 30 sec each foot 1 LOB with R in front   Modified SLS 3 x 10s each "    Tandem walking forward x 3   Backward walking x 3   Toe taps to cones x 10 each-bilateral two finger support    HOLD below  Balance on foam with feet together with eyes closed x 30 sec  Balance on foam tandem x 30 sec B positions     Patient Education and Home Exercises     Home Exercises Provided and Patient Education Provided     Education provided:   - HEP   - exercise in pain free zone       Written Home Exercises Provided:  Instructed patient to continue current home exercise program.    Exercises were reviewed and Satya was able to demonstrate them prior to the end of the session.  Satya demonstrated good  understanding of the education provided. See EMR under Patient Instructions for exercises provided during therapy sessions    ASSESSMENT     Cues for eccentric control with supine exercises with good correction. Progression of strengthening exercises with increased reps. Manual cues for stacking hips in side lying hip abduction. Tactile cues for increased grounding of left>right toes with scrunches. Posterior LOB with static stand with eyes closed with and without foam, and relies heavily on parallel bars for recovery and occasional A per therapist. Able to progress with standing balance training with verbal cues to prevent locking out left knee in stance with good correction without manual cues. No episodes of locking out left knee with step ups today.      Satya Is progressing well towards his goals.   Pt prognosis is Fair.     Pt will continue to benefit from skilled outpatient physical therapy to address the goals listed in the initial evaluation, provide pt/family education and to maximize pt's level of independence in the home and community environment.     Pt's spiritual, cultural and educational needs considered and pt agreeable to plan of care and goals.     Anticipated barriers to physical therapy: slight language barrier possible and will provide  as needed    GOALS:   Short Term  Goals:  4 weeks   MET STG's  Increase range of motion 25%  Increase strength 1/2 muscle grade  Be able to perform HEP with minimal cueing required  Improve Bello Balance to 41/56     Long Term Goals: 8 weeks   ongoing  Increase range of motion to 75% to 100% full   Improve muscle strength 1 muscle grade  Improve Bello Balance to 45/56   Restore ability to ambulate with normal gait with AFO   Walking for ADL will be restored without episodes of falls  Restore ability to stand for ADL without episodes of falls  Restore ability to walk in yard without episodes of falls  Restore ability to perform ADL's and household activities independently and without episodes of falls    PLAN   Continue with established plan of care towards PT goals.      Progress with strength and balance work as tolerated     Selin Oneil, PTA

## 2022-12-09 NOTE — PROGRESS NOTES
[facility]   Physical Therapy Reassessment/Plan of Care and Treatment Note     Name: Satya Jackson  Clinic Number: [id number]    Therapy Diagnosis:   Encounter Diagnoses   Name Primary?    Muscle weakness Yes    Decreased range of motion     Unsteady gait          Physician: Zenia Bender,*    Visit Date: 12/12/2022    Physician Orders: Peripheral neuropathy with right foot drop.  Patient with falls and poor postural stability (will start to fall forward when standing).  Please eval and treat.  Medical Diagnosis from Referral:   G63 (ICD-10-CM) - Polyneuropathy associated with underlying disease   M21.371 (ICD-10-CM) - Right foot drop   R29.6 (ICD-10-CM) - Frequent falls      Evaluation Date: 10/24/2022  Authorization Period Expiration: 12/19/2022  Plan of Care Expiration: 1-9-23  Progress Note Due: 1-9-23  Visit # / Visits authorized: 8/ 21  FOTO: Issued Visit #: 1     MD Follow up appointment: none scheduled     Precautions: hx of Guillian [place], R foot drop, hx of stroke and CHF, peripheral neuropathy, diabetes    PTA Visit #: 1/5     Time In: 1:38   Time Out: 2:20  Total Billable Time: 42 minutes    SUBJECTIVE     Pt reports: he is feeling pretty good, feeling more stable     Pt was compliant with home exercise program.  Response to previous treatment: felt ok  Functional change: walking improved and wearing AFO    Pt reports (none) same foot pain, no change, understands focus is on balance and gait     OBJECTIVE   Functional assessment:   - walking/gait: slight FB of trunk but normal gait at IE slight FB of trunk with wide based steppage gait   - sit to stand: min use of hands, but able to perform with no hands at IE mod use of hands  - sit to supine: no difficulty       - supine to sit: no difficulty  - supine to prone: no difficulty at IE min difficulty     Ankle ROM: (measured in degrees) same as 11-28-22  Ankle RLE LLE   Dorsiflexion with knees straight 0/0 0/0    Dorsiflexion with knees bent -5/-5 5/5   Plantarflexion 45/45 45/45   Inversion 30/30 30/30   Eversion 20/20 20/20   Great toe flexion 30/30 30/35   Great toe extension 0/35 35/45             Ankle ROM: (measured in degrees) initial eval  Ankle  Active /Passive RLE LLE   Dorsiflexion with knees straight -5/-5 0/0   Dorsiflexion with knees bent -5/-5 5/5   Plantarflexion 45/45 45/45   Inversion 30/30 30/30   Eversion 15/15 20/20   Great toe flexion 30/30 30/35   Great toe extension  0/35 35/45      Knee  ROM: WNL     Flexibility testing:  - hamstrings:     90/90 test same as 11-28-22R 20 L 15 at IE R 20 L 20         Muscle Strength 12-12-22  MMT R L   Hip flexion 5-/5 4+/5   Hip abduction 4+/5 5-/5   Hip extension 4+/5 4+/5   Glut max 4-/5 3+/5   Toe flexors     Toe extensor EHL  0/5 3-/5   Knee extension 5/5 5/5   Knee flexion 5/5 5/5   Ankle dorsiflexion 3/5 5/5   Ankle plantar flexion 4/5 4/5   Ankle inversion + PF  4-/5 5/5   Ankle eversion 4/5 5/5                 Muscle Strength initial eval   MMT R L   Hip flexion 4-/5 4-/5   Hip abduction 4/5 4-/5   Hip extension 4+/5 4+/5   Glut max 3-/5 3-/5   Toe flexors 3+/5 3/5   Toe extensors EHL 0/5 3-/5   Knee extension 5/5 5/5   Knee flexion 4/5 4+/5   Ankle dorsiflexion 3/5 5/5   Ankle plantar flexion 4-/5 4/5   Ankle inversion + PF  3+/5 3+/5   Ankle eversion 4/5 4+/5      Endurance is fair     Special tests: Bello Balance score 43/56 at IE 38/56     Palpation: no c/o TTP in foot and ankle      Joint mobility: not tested      Sensation: pt able to feel light touch and pressure and toe positioning for proprioception         CMS Impairment/Limitation/Restriction for FOTO balance Survey     Therapist reviewed FOTO scores for Satya Jackson   FOTO documents entered into [employer] - see Media section.     Limitation Score: 57 % on 11-28-22at IE 56%          Treatment     Satya received the treatments listed below:       Satya received therapeutic exercises to  "develop strength, endurance, ROM, flexibility, and core stabilization for 27 minutes including:  Performed in clinic as warm up Instructed pt to continue working on increasing reps as tolerated at home    Attempted GTB with ankle motion and unable to perform in any plane but PF so held    Pt reports he has not been doing soleus stretch just sit to stand.  Pt reports he understands to do both ex now.  Reprinted soleus stretch as per pt request   Soleus stretch with strap x 10  Sit to stand x 10 pillow in chair      GSS with strap x 10 long sitting on mat-seated in chair today  Soleus stretch with strap x 10  Toe crunches x 3 min reminder to straighten toes back out between reps   Toe abd/add x 20   Active ankle IN/EV sitting x 10   Active DF sitting x 10  Heel raises with resistance from self x 10  Marbles x 3:00 about 1/2 bucket alternated between feet     Instructed pt to increase reps with strengthening as giovanna.      Not performed due to time  Bridge x 20-no cramp today  SLRx 15 cues for eccentric control  Hip abd SL x 10-manual A for maintaining stacked hips  March with abdominal bracing, supine x 15     Minisquats x 15  Toe touch on 6" step x 10 each leg  Forward step up 6"  x 10 On L 2 episodes of knee locking into extension without good control.  Provided support with PT behind knee to make sure he did not hyperextend knee.  Pt with good control on R but monitored closely with PT providing support at post knee to make sure and avoid possibility of hyperextension   TKE with RTB x 10 standing-NP time  Sit to stand with foam in chair x 10 fatigue at end   Hip ext leaning on counter(4" step with pillow on mat in clinic)-NP time  neuromuscular re-education activities to improve: Balance, Coordination, Kinesthetic, Sense, and Proprioception for 15 minutes. The following activities were included:    Performed in // bars:    Bello balance test  Not performed below due to time   March forward/backward x 2 laps  Walk " sideways x 2 laps  Balance in standing with eyes closed 2 x 30 sec  LOB x 3 no assistance from PT required  Balance in standing with feet together 2 x 30 sec  Balance in standing with feet together with eyes closed 2 x 30 sec  LOB required min assist from PT x 3  Balance on foam 2 x 30 sec with mod instability noted, but able to maintain balance  Balance on foam with eyes closed 2 x 30 sec LOB with PT assistance required   Balance on foam with feet together 2 x 30 sec  Modified rhomberg 2 x 30 sec each foot 1 LOB with R in front   Modified SLS 3 x 10s each    Tandem walking forward x 3   Backward walking x 3   Toe taps to cones x 10 each-bilateral two finger support    HOLD below  Balance on foam with feet together with eyes closed x 30 sec  Balance on foam tandem x 30 sec B positions     Patient Education and Home Exercises     Home Exercises Provided and Patient Education Provided     Education provided:   - HEP   - exercise in pain free zone       Written Home Exercises Provided:  Instructed patient to continue current home exercise program.    Exercises were reviewed and Satya was able to demonstrate them prior to the end of the session.  Satya demonstrated good  understanding of the education provided. See EMR under Patient Instructions for exercises provided during therapy sessions    ASSESSMENT   Pt has not been able to attend PT on a regular basis due to developing car trouble resulting in lack of transportation and then appt times not working due to  issues.  Pt currently feels upcoming appts should enable him to be more compliant with PT. Pt does display some improvement in strength and in Bello Balance test and will benefit from continued treatment to assist pt in fully achieving his goals for another 4 weeks      Satya Is progressing well towards his goals.   Pt prognosis is Fair.     Pt will continue to benefit from skilled outpatient physical therapy to address the goals listed in the  initial evaluation, provide pt/family education and to maximize pt's level of independence in the home and community environment.     Pt's spiritual, cultural and educational needs considered and pt agreeable to plan of care and goals.     Anticipated barriers to physical therapy: slight language barrier possible and will provide  as needed    GOALS:   Short Term Goals:  4 weeks   MET STG's  Increase range of motion 25%  Increase strength 1/2 muscle grade  Be able to perform HEP with minimal cueing required  Improve Bello Balance to 41/56     Long Term Goals: 8 weeks   ongoing  Increase range of motion to 75% to 100% full   Improve muscle strength 1 muscle grade  Improve Bello Balance to 45/56   Restore ability to ambulate with normal gait with AFO   Walking for ADL will be restored without episodes of falls  Restore ability to stand for ADL without episodes of falls  Restore ability to walk in yard without episodes of falls  Restore ability to perform ADL's and household activities independently and without episodes of falls    PLAN       Plan of care Certification: 12-12/2022 to 1-9-23     Outpatient Physical Therapy 2 times weekly for 4 weeks to include the following interventions:  Therapeutic exercises, manual therapy  neuromuscular re-education , therapeutic activities, therapeutic taping, modalities will be considered and utilized as needed; temporary orthotics will be considered and utilized as needed     Lizbeth Castro, PT      I certify the need for these services furnished under this plan of treatment and while under my care.     ____________________________________ Physician/Referring Practitioner                                       Date of Signature

## 2022-12-12 ENCOUNTER — CLINICAL SUPPORT (OUTPATIENT)
Dept: REHABILITATION | Facility: HOSPITAL | Age: 52
End: 2022-12-12
Payer: MEDICAID

## 2022-12-12 DIAGNOSIS — M25.60 DECREASED RANGE OF MOTION: ICD-10-CM

## 2022-12-12 DIAGNOSIS — R26.81 UNSTEADY GAIT: ICD-10-CM

## 2022-12-12 DIAGNOSIS — M62.81 MUSCLE WEAKNESS: Primary | ICD-10-CM

## 2022-12-12 PROCEDURE — 97112 NEUROMUSCULAR REEDUCATION: CPT | Mod: PN | Performed by: PHYSICAL THERAPIST

## 2022-12-12 PROCEDURE — 97110 THERAPEUTIC EXERCISES: CPT | Mod: PN | Performed by: PHYSICAL THERAPIST

## 2022-12-12 NOTE — PLAN OF CARE
[facility] [place] [facility]   Physical Therapy Reassessment/Plan of Care      Name: Satya Jackson  Clinic Number: [id number]    Therapy Diagnosis:   Encounter Diagnoses   Name Primary?    Muscle weakness Yes    Decreased range of motion     Unsteady gait          Physician: Zenia Bender,*    Visit Date: 12/12/2022    Physician Orders: Peripheral neuropathy with right foot drop.  Patient with falls and poor postural stability (will start to fall forward when standing).  Please eval and treat.  Medical Diagnosis from Referral:   G63 (ICD-10-CM) - Polyneuropathy associated with underlying disease   M21.371 (ICD-10-CM) - Right foot drop   R29.6 (ICD-10-CM) - Frequent falls      Evaluation Date: 10/24/2022  Authorization Period Expiration: 12/19/2022  Plan of Care Expiration: 1-9-23  Progress Note Due: 1-9-23  Visit # / Visits authorized: 8/ 21  FOTO: Issued Visit #: 1     MD Follow up appointment: none scheduled     Precautions: hx of Guillian Granville, R foot drop, hx of stroke and CHF, peripheral neuropathy, diabetes    PTA Visit #: 1/5     Time In: 1:38   Time Out: 2:20  Total Billable Time: 42 minutes    SUBJECTIVE     Pt reports: he is feeling pretty good, feeling more stable     Pt was compliant with home exercise program.  Response to previous treatment: felt ok  Functional change: walking improved and wearing AFO    Pt reports (none) same foot pain, no change, understands focus is on balance and gait     OBJECTIVE   Functional assessment:   - walking/gait: slight FB of trunk but normal gait at IE slight FB of trunk with wide based steppage gait   - sit to stand: min use of hands, but able to perform with no hands at IE mod use of hands  - sit to supine: no difficulty       - supine to sit: no difficulty  - supine to prone: no difficulty at IE min difficulty     Ankle ROM: (measured in degrees) same as 11-28-22  Ankle RLE LLE   Dorsiflexion with knees straight 0/0 0/0   Dorsiflexion with  knees bent -5/-5 5/5   Plantarflexion 45/45 45/45   Inversion 30/30 30/30   Eversion 20/20 20/20   Great toe flexion 30/30 30/35   Great toe extension 0/35 35/45             Ankle ROM: (measured in degrees) initial eval  Ankle  Active /Passive RLE LLE   Dorsiflexion with knees straight -5/-5 0/0   Dorsiflexion with knees bent -5/-5 5/5   Plantarflexion 45/45 45/45   Inversion 30/30 30/30   Eversion 15/15 20/20   Great toe flexion 30/30 30/35   Great toe extension  0/35 35/45      Knee  ROM: WNL     Flexibility testing:  - hamstrings:     90/90 test same as 11-28-22R 20 L 15 at IE R 20 L 20         Muscle Strength 12-12-22  MMT R L   Hip flexion 5-/5 4+/5   Hip abduction 4+/5 5-/5   Hip extension 4+/5 4+/5   Glut max 4-/5 3+/5   Toe flexors     Toe extensor EHL  0/5 3-/5   Knee extension 5/5 5/5   Knee flexion 5/5 5/5   Ankle dorsiflexion 3/5 5/5   Ankle plantar flexion 4/5 4/5   Ankle inversion + PF  4-/5 5/5   Ankle eversion 4/5 5/5                 Muscle Strength initial eval   MMT R L   Hip flexion 4-/5 4-/5   Hip abduction 4/5 4-/5   Hip extension 4+/5 4+/5   Glut max 3-/5 3-/5   Toe flexors 3+/5 3/5   Toe extensors EHL 0/5 3-/5   Knee extension 5/5 5/5   Knee flexion 4/5 4+/5   Ankle dorsiflexion 3/5 5/5   Ankle plantar flexion 4-/5 4/5   Ankle inversion + PF  3+/5 3+/5   Ankle eversion 4/5 4+/5      Endurance is fair     Special tests: Bello Balance score 43/56 at IE 38/56     Palpation: no c/o TTP in foot and ankle      Joint mobility: not tested      Sensation: pt able to feel light touch and pressure and toe positioning for proprioception         CMS Impairment/Limitation/Restriction for FOTO balance Survey     Therapist reviewed FOTO scores for Satya Jackson   FOTO documents entered into [employer] - see Media section.     Limitation Score: 57 % on 11-28-22at IE 56%      ASSESSMENT     Pt has not been able to attend PT on a regular basis due to developing car trouble resulting in lack of transportation  and then appt times not working due to  issues.  Pt currently feels upcoming appts should enable him to be more compliant with PT. Pt does display some improvement in strength and in Bello Balance test and will benefit from continued treatment to assist pt in fully achieving his goals for another 4 weeks      Satya Is progressing well towards his goals.   Pt prognosis is Fair.     Pt will continue to benefit from skilled outpatient physical therapy to address the goals listed in the initial evaluation, provide pt/family education and to maximize pt's level of independence in the home and community environment.     Pt's spiritual, cultural and educational needs considered and pt agreeable to plan of care and goals.     Anticipated barriers to physical therapy: slight language barrier possible and will provide  as needed    GOALS:   Short Term Goals:  4 weeks   MET STG's  Increase range of motion 25%  Increase strength 1/2 muscle grade  Be able to perform HEP with minimal cueing required  Improve Bello Balance to 41/56     Long Term Goals: 8 weeks   ongoing  Increase range of motion to 75% to 100% full   Improve muscle strength 1 muscle grade  Improve Bello Balance to 45/56   Restore ability to ambulate with normal gait with AFO   Walking for ADL will be restored without episodes of falls  Restore ability to stand for ADL without episodes of falls  Restore ability to walk in yard without episodes of falls  Restore ability to perform ADL's and household activities independently and without episodes of falls    PLAN       Plan of care Certification: 12-12/2022 to 1-9-23     Outpatient Physical Therapy 2 times weekly for 4 weeks to include the following interventions:  Therapeutic exercises, manual therapy  neuromuscular re-education , therapeutic activities, therapeutic taping, modalities will be considered and utilized as needed; temporary orthotics will be considered and utilized as needed      Lizbeth Castro, PT      I certify the need for these services furnished under this plan of treatment and while under my care.     ____________________________________ Physician/Referring Practitioner                                       Date of Signature

## 2022-12-29 ENCOUNTER — TELEPHONE (OUTPATIENT)
Dept: REHABILITATION | Facility: HOSPITAL | Age: 52
End: 2022-12-29
Payer: MEDICAID

## 2022-12-30 ENCOUNTER — DOCUMENTATION ONLY (OUTPATIENT)
Dept: REHABILITATION | Facility: HOSPITAL | Age: 52
End: 2022-12-30
Payer: MEDICAID

## 2022-12-30 DIAGNOSIS — R26.81 UNSTEADY GAIT: ICD-10-CM

## 2022-12-30 DIAGNOSIS — M25.60 DECREASED RANGE OF MOTION: ICD-10-CM

## 2022-12-30 DIAGNOSIS — M62.81 MUSCLE WEAKNESS: Primary | ICD-10-CM

## 2022-12-30 NOTE — PROGRESS NOTES
PHYSICAL THERAPY DISCHARGE SUMMARY    Name: Satya Jackson  Referring Provider: Zenia Bender,*  PT Order: Peripheral neuropathy with right foot drop.  Patient with falls and poor postural stability (will start to fall forward when standing).  Please eval and treat.  Clinical #: [id number]  Discharge Summary Date: 12/30/2022  Diagnosis:   1. Muscle weakness        2. Decreased range of motion        3. Unsteady gait            Patient was seen for 8 OP PT visits from 10-24-22 to 12-12-22. Pt cancelled/no show visit 11 scheduled sessions. Treatment included: evaluation, HEP, pt education, ther ex, and neuromuscular re-education. PT unable to fully assess goal achievement as pt did not return for follow up sessions/did not reschedule follow up visits. This patient is discharged from OP PT Services.

## 2023-01-30 ENCOUNTER — TELEPHONE (OUTPATIENT)
Dept: NEUROLOGY | Facility: CLINIC | Age: 53
End: 2023-01-30
Payer: MEDICAID

## 2023-01-30 NOTE — TELEPHONE ENCOUNTER
----- Message from Marvin Van sent at 1/30/2023  2:00 PM CST -----  Regarding: reschedule  Contact: YURI MORALES [[id number]]  Type:  Sooner Appointment Request    Caller is requesting a sooner appointment.      Name of Caller:  Hue    When is the first available appointment?  Dept book    Symptoms:  na    Best Call Back Number:  [phone]     Additional Information:  Pt needs to reschedule an EMG from this afternoon. Please call to advise

## 2023-01-31 NOTE — TELEPHONE ENCOUNTER
Lvm informing patient of time and date 4/3/23@1PM for EMG study; placed on hold and send message to management to schedule.

## 2023-03-30 ENCOUNTER — TELEPHONE (OUTPATIENT)
Dept: NEUROLOGY | Facility: CLINIC | Age: 53
End: 2023-03-30
Payer: MEDICAID

## 2023-04-03 ENCOUNTER — TELEPHONE (OUTPATIENT)
Dept: NEUROLOGY | Facility: CLINIC | Age: 53
End: 2023-04-03
Payer: MEDICAID

## 2023-04-03 ENCOUNTER — PROCEDURE VISIT (OUTPATIENT)
Dept: NEUROLOGY | Facility: CLINIC | Age: 53
End: 2023-04-03
Payer: MEDICAID

## 2023-04-03 DIAGNOSIS — M21.371 RIGHT FOOT DROP: ICD-10-CM

## 2023-04-03 DIAGNOSIS — R29.6 FREQUENT FALLS: Primary | ICD-10-CM

## 2023-04-03 DIAGNOSIS — G63 POLYNEUROPATHY ASSOCIATED WITH UNDERLYING DISEASE: ICD-10-CM

## 2023-04-03 PROCEDURE — 95911 NRV CNDJ TEST 9-10 STUDIES: CPT | Mod: 26,S$PBB,, | Performed by: PSYCHIATRY & NEUROLOGY

## 2023-04-03 PROCEDURE — 95886 MUSC TEST DONE W/N TEST COMP: CPT | Mod: PBBFAC,PO | Performed by: PSYCHIATRY & NEUROLOGY

## 2023-04-03 PROCEDURE — 95911 PR NERVE CONDUCTION STUDY; 9-10 STUDIES: ICD-10-PCS | Mod: 26,S$PBB,, | Performed by: PSYCHIATRY & NEUROLOGY

## 2023-04-03 PROCEDURE — 95886 MUSC TEST DONE W/N TEST COMP: CPT | Mod: 26,S$PBB,, | Performed by: PSYCHIATRY & NEUROLOGY

## 2023-04-03 PROCEDURE — 95886 PR EMG COMPLETE, W/ NERVE CONDUCTION STUDIES, 5+ MUSCLES: ICD-10-PCS | Mod: 26,S$PBB,, | Performed by: PSYCHIATRY & NEUROLOGY

## 2023-04-03 PROCEDURE — 95911 NRV CNDJ TEST 9-10 STUDIES: CPT | Mod: PBBFAC,PO | Performed by: PSYCHIATRY & NEUROLOGY

## 2023-04-03 NOTE — TELEPHONE ENCOUNTER
LVM informing patient of the written Ambulatory referral/consult to pysical/occupational therapy from Dr. Bender would be mailed to him.  Patient left before the order was given to him.

## 2023-05-01 NOTE — PROCEDURES
[facility] [place] [facility]  [address] [facility] [address]  [place] [address]  [phone]      Full Name: Satya Jackson Gender: Male  Patient ID: [id number] YOB: 1970      Visit Date: 4/3/2023 1:21 PM  Age: 52 Years  Examining Physician: Zenia Bender D.O., ABPN, AOBNP, ABEM   Referring Physician: Zenia Bender D.O.   Technologist: JULIAN Herrera   Height: 5 feet 6 inch  History: Diabetic patient complaining of difficulty with balance, with burning pain, numbness and tingling of both feet.  He complains of right foot drop. Low back pain on both sides.  Hx GBS 2018, h/o HIV.  Evaluate the right upper and lower extremities.      Sensory NCS      Nerve / Sites Rec. Site Onset Lat Peak Lat NP Amp Segments Distance Velocity Temp.     ms ms µV  cm m/s °C   R Median - Digit II (Antidromic)      Wrist Dig II 3.13 4.08 3.1 Wrist - Dig II 13 42 32.9      Ref.   ?3.60 ?15.0 Ref.  ?50    R Ulnar - Digit V (Antidromic)      Wrist Dig V 2.19 2.75 4.6 Wrist - Dig V 11 50 32.9      Ref.   ?3.10 ?12.0 Ref.  ?50    R Sural - Ankle (Calf)      Calf Ankle NR NR NR Calf - Ankle 14 NR 32.9      Ref.   ?4.60 ?4.0 Ref.  ?40    R Superficial peroneal - Ankle      Lat leg Ankle NR NR NR Lat leg - Ankle 12 NR 32.9      Ref.   ?4.50 ?4.0 Ref.          Motor NCS      Nerve / Sites Muscle Latency Ref. Amplitude Ref. Amp % Duration Segments Distance Lat Diff Ref. Velocity Ref. Temp.     ms ms mV mV % ms  cm ms ms m/s m/s °C   R Median - APB      Wrist APB 3.69 ?4.00 12.7 ?6.0 100 6.42 Wrist - APB      32.9      Elbow APB 8.21  12.2  96.6 6.90 Elbow - Wrist 23.5 4.52  52 ?50 32.9   R Ulnar - ADM      Wrist ADM 2.46 ?3.10 8.5 ?7.0 100 6.04 Wrist - ADM      32.9      B.Elbow ADM 6.23  8.3  98.3 6.29 B.Elbow - Wrist 20 3.77  53 ?50 32.9      A.Elbow ADM 8.50  8.3  98 6.46 A.Elbow - B.Elbow 12 2.27  53  32.9   R Peroneal - EDB      Ankle EDB NR ?6.00 NR ?2.5 NR NR Ankle - EDB      32.9    R Peroneal - Tib Ant      Fib Head Tib Ant 2.54 ?4.50 3.4 ?3.0 100 10.75 Fib Head - Tib Ant      32.9      Pop fossa Tib Ant 5.69  3.2  93.8 11.81 Pop fossa - Fib Head 12 3.15  38 ?40 32.9   R Tibial - AH      Ankle AH 5.04 ?6.00 0.1 ?4.0 100 8.69 Ankle - AH      32.9      Pop fossa AH 20.31  0.1  48.9  Pop fossa - Ankle 39 15.27  26 ?40 32.9       F  Wave      Nerve Fmin Ref.    ms ms   R Median - APB 29.01 ?31.00   R Ulnar - ADM 29.43 ?32.00   R Peroneal - EDB NR ?56.00   R Tibial - AH NR ?56.00       EMG Summary Table     Spontaneous Recruitment Activation Duration Amplitude Polyphasia Comment   Muscle Ins Act Fib Fasc Pattern - - - - -   R. First dorsal interosseous Normal 0 0 Sl Dec Normal N/+1 N/+1 N/+1 Normal   R. Abductor pollicis brevis Normal 0 0 Sl Dec Normal N/+1 +1 N/+1 Normal   R. Pronator teres Normal 0 0 Normal Normal Normal Normal Normal Normal   R. Extensor digitorum communis Normal 0 0 Normal Normal Normal Normal Normal Normal   R. Biceps brachii Normal 0 0 Normal Normal Normal Normal Normal Normal   R. Triceps brachii Normal 0 0 Normal Normal Normal Normal Normal Normal   R. Deltoid Normal 0 0 Normal Normal Normal Normal Normal Normal   R. Tibialis anterior Normal 0 0 Sl Dec Normal +1 +1 +1 Normal   R. Gastrocnemius (Medial head) Normal 0 0 Sl Dec Normal +1 +1 +1 Normal   R. Extensor hallucis longus Normal 0 0 Mod Dec Normal +1 +1 +1 Normal   R. Flexor digitorum longus Normal 0 0 Mod Dec Normal +1 +2 +1 Normal   R. Vastus medialis Normal 0 0 Normal Normal Normal Normal Normal Normal   R. Tensor fasciae latae Normal 0 0 Normal Normal Normal Normal Normal Normal   R. Biceps femoris (short head) Normal 0 0 Normal Normal Normal Normal Normal Normal         Satya Jackson [id number] 4/3/2023 1:21 PM     3 of 4    Summary:  Nerve conduction studies were performed in the right upper and lower extremities.  Right median sensory response was prolonged with a reduced amplitude.  Right ulnar sensory  response was normal in latency with a reduced amplitude.  Right sural and superficial peroneal sensory responses were absent.  Right median and ulnar motor responses were normal in amplitude, latency and velocity.  Right peroneal motor response recording the extensor digitorum brevis muscle was absent.  Right peroneal motor response recording the tibialis anterior was normal in amplitude and latency with a borderline velocity.  Right tibial motor study was markedly reduced in amplitude with normal latency and slowing of the velocity.  Right median and ulnar minimal F wave latencies were normal.  Right peroneal and tibial F waves were absent.  Needle EMG was performed in the right upper and lower extremities.  Motor units were large, long and poly phasic with reduced recruitment in the right 1st dorsal interosseous, abductor pollicis brevis, tibialis anterior, medial gastrocnemius, extensor hallucis longus and flexor digitorum longus muscles.  All other motor unit morphology and recruitment patterns were normal.    Impression:  This is an abnormal EMG of the right upper and lower extremities.  There is electrophysiologic evidence of a chronic, severe, peripheral polyneuropathy that is axonal in nature without active denervation.  There is no evidence of any other focal neuropathy, plexopathy, radiculopathy or myopathy on the study.        Thank you for referring to the [facility] in [place]. Please feel free to contact the clinic if you have any further questions regarding this study or report.       _____________________________  Zenia Bender D.O., ABPN, AOBNP, RUSS Jackson [id number] 4/3/2023 1:21 PM     3 of 4

## 2024-07-03 PROBLEM — S86.911A KNEE STRAIN, RIGHT, INITIAL ENCOUNTER: Status: ACTIVE | Noted: 2024-07-03

## 2024-07-03 PROBLEM — M25.661 DECREASED RANGE OF MOTION (ROM) OF RIGHT KNEE: Status: ACTIVE | Noted: 2024-07-03

## 2024-07-03 PROBLEM — R29.898 WEAKNESS OF RIGHT LOWER EXTREMITY: Status: ACTIVE | Noted: 2024-07-03

## 2024-07-03 PROBLEM — R26.89 ANTALGIC GAIT: Status: ACTIVE | Noted: 2024-07-03

## 2024-09-09 ENCOUNTER — LAB VISIT (OUTPATIENT)
Dept: LAB | Facility: HOSPITAL | Age: 54
End: 2024-09-09
Attending: PSYCHIATRY & NEUROLOGY
Payer: MEDICAID

## 2024-09-09 ENCOUNTER — OFFICE VISIT (OUTPATIENT)
Dept: NEUROLOGY | Facility: CLINIC | Age: 54
End: 2024-09-09
Payer: MEDICAID

## 2024-09-09 VITALS
SYSTOLIC BLOOD PRESSURE: 123 MMHG | WEIGHT: 228.38 LBS | HEIGHT: 66 IN | BODY MASS INDEX: 36.7 KG/M2 | HEART RATE: 83 BPM | RESPIRATION RATE: 20 BRPM | DIASTOLIC BLOOD PRESSURE: 89 MMHG

## 2024-09-09 DIAGNOSIS — R25.2 MUSCLE CRAMPING: ICD-10-CM

## 2024-09-09 DIAGNOSIS — E11.65 TYPE 2 DIABETES MELLITUS WITH HYPERGLYCEMIA, WITHOUT LONG-TERM CURRENT USE OF INSULIN: ICD-10-CM

## 2024-09-09 DIAGNOSIS — Z79.899 ON STATIN THERAPY: ICD-10-CM

## 2024-09-09 DIAGNOSIS — R25.3 FASCICULATIONS: ICD-10-CM

## 2024-09-09 DIAGNOSIS — G63 POLYNEUROPATHY ASSOCIATED WITH UNDERLYING DISEASE: Primary | ICD-10-CM

## 2024-09-09 DIAGNOSIS — R26.89 IMBALANCE: ICD-10-CM

## 2024-09-09 DIAGNOSIS — G63 POLYNEUROPATHY ASSOCIATED WITH UNDERLYING DISEASE: ICD-10-CM

## 2024-09-09 LAB
ALBUMIN SERPL BCP-MCNC: 4.2 G/DL (ref 3.5–5.2)
ALP SERPL-CCNC: 243 U/L (ref 55–135)
ALT SERPL W/O P-5'-P-CCNC: 43 U/L (ref 10–44)
ANION GAP SERPL CALC-SCNC: 9 MMOL/L (ref 8–16)
AST SERPL-CCNC: 29 U/L (ref 10–40)
BILIRUB SERPL-MCNC: 0.9 MG/DL (ref 0.1–1)
BUN SERPL-MCNC: 17 MG/DL (ref 6–20)
CALCIUM SERPL-MCNC: 10.3 MG/DL (ref 8.7–10.5)
CHLORIDE SERPL-SCNC: 107 MMOL/L (ref 95–110)
CK SERPL-CCNC: 143 U/L (ref 20–200)
CO2 SERPL-SCNC: 26 MMOL/L (ref 23–29)
CREAT SERPL-MCNC: 1.6 MG/DL (ref 0.5–1.4)
EST. GFR  (NO RACE VARIABLE): 50.3 ML/MIN/1.73 M^2
ESTIMATED AVG GLUCOSE: 163 MG/DL (ref 68–131)
GLUCOSE SERPL-MCNC: 209 MG/DL (ref 70–110)
HBA1C MFR BLD: 7.3 % (ref 4–5.6)
POTASSIUM SERPL-SCNC: 5.2 MMOL/L (ref 3.5–5.1)
PROT SERPL-MCNC: 8.2 G/DL (ref 6–8.4)
SODIUM SERPL-SCNC: 142 MMOL/L (ref 136–145)

## 2024-09-09 PROCEDURE — 82550 ASSAY OF CK (CPK): CPT | Performed by: PSYCHIATRY & NEUROLOGY

## 2024-09-09 PROCEDURE — 80053 COMPREHEN METABOLIC PANEL: CPT | Performed by: PSYCHIATRY & NEUROLOGY

## 2024-09-09 PROCEDURE — 99999 PR PBB SHADOW E&M-EST. PATIENT-LVL V: CPT | Mod: PBBFAC,,, | Performed by: PSYCHIATRY & NEUROLOGY

## 2024-09-09 PROCEDURE — 99215 OFFICE O/P EST HI 40 MIN: CPT | Mod: PBBFAC,PO | Performed by: PSYCHIATRY & NEUROLOGY

## 2024-09-09 PROCEDURE — 83036 HEMOGLOBIN GLYCOSYLATED A1C: CPT | Performed by: PSYCHIATRY & NEUROLOGY

## 2024-09-09 RX ORDER — ALPHA LIPOIC ACID 600 MG
600 TABLET ORAL DAILY
COMMUNITY
Start: 2024-09-09 | End: 2025-09-09

## 2024-09-20 NOTE — PROGRESS NOTES
Glucose is elevated and creatinine is elevated.  This could be contributing to patients concerns.  The patient was notified by portal.

## 2025-03-14 ENCOUNTER — RESULTS FOLLOW-UP (OUTPATIENT)
Dept: CARDIOLOGY | Facility: CLINIC | Age: 55
End: 2025-03-14